# Patient Record
Sex: MALE | Race: WHITE | NOT HISPANIC OR LATINO | Employment: FULL TIME | URBAN - METROPOLITAN AREA
[De-identification: names, ages, dates, MRNs, and addresses within clinical notes are randomized per-mention and may not be internally consistent; named-entity substitution may affect disease eponyms.]

---

## 2021-12-03 ENCOUNTER — APPOINTMENT (OUTPATIENT)
Dept: CT IMAGING | Facility: HOSPITAL | Age: 56
DRG: 312 | End: 2021-12-03
Attending: EMERGENCY MEDICINE
Payer: COMMERCIAL

## 2021-12-03 ENCOUNTER — HOSPITAL ENCOUNTER (INPATIENT)
Facility: HOSPITAL | Age: 56
LOS: 2 days | Discharge: HOME | DRG: 312 | End: 2021-12-05
Attending: EMERGENCY MEDICINE | Admitting: STUDENT IN AN ORGANIZED HEALTH CARE EDUCATION/TRAINING PROGRAM
Payer: COMMERCIAL

## 2021-12-03 DIAGNOSIS — R55 SYNCOPE, UNSPECIFIED SYNCOPE TYPE: ICD-10-CM

## 2021-12-03 DIAGNOSIS — R00.1 SINUS BRADYCARDIA: ICD-10-CM

## 2021-12-03 LAB
ALBUMIN SERPL-MCNC: 4.1 G/DL (ref 3.5–5)
ALP SERPL-CCNC: 122 U/L (ref 45–120)
ALT SERPL W P-5'-P-CCNC: 61 U/L (ref 0–45)
ANION GAP SERPL CALCULATED.3IONS-SCNC: 13 MMOL/L (ref 5–18)
AST SERPL W P-5'-P-CCNC: 41 U/L (ref 0–40)
BASOPHILS # BLD AUTO: 0.1 10E3/UL (ref 0–0.2)
BASOPHILS NFR BLD AUTO: 1 %
BILIRUB SERPL-MCNC: 0.7 MG/DL (ref 0–1)
BUN SERPL-MCNC: 12 MG/DL (ref 8–22)
CALCIUM SERPL-MCNC: 9.4 MG/DL (ref 8.5–10.5)
CHLORIDE BLD-SCNC: 106 MMOL/L (ref 98–107)
CO2 SERPL-SCNC: 20 MMOL/L (ref 22–31)
CREAT BLD-MCNC: 0.9 MG/DL (ref 0.7–1.3)
CREAT SERPL-MCNC: 1.04 MG/DL (ref 0.7–1.3)
EOSINOPHIL # BLD AUTO: 0.1 10E3/UL (ref 0–0.7)
EOSINOPHIL NFR BLD AUTO: 1 %
ERYTHROCYTE [DISTWIDTH] IN BLOOD BY AUTOMATED COUNT: 12 % (ref 10–15)
GFR SERPL CREATININE-BSD FRML MDRD: 80 ML/MIN/1.73M2
GFR SERPL CREATININE-BSD FRML MDRD: >60 ML/MIN/1.73M2
GLUCOSE BLD-MCNC: 126 MG/DL (ref 70–125)
HCT VFR BLD AUTO: 43.2 % (ref 40–53)
HGB BLD-MCNC: 14.8 G/DL (ref 13.3–17.7)
HOLD SPECIMEN: NORMAL
IMM GRANULOCYTES # BLD: 0.1 10E3/UL
IMM GRANULOCYTES NFR BLD: 1 %
LACTATE SERPL-SCNC: 1.6 MMOL/L (ref 0.7–2)
LACTATE SERPL-SCNC: 3 MMOL/L (ref 0.7–2)
LYMPHOCYTES # BLD AUTO: 1.3 10E3/UL (ref 0.8–5.3)
LYMPHOCYTES NFR BLD AUTO: 18 %
MAGNESIUM SERPL-MCNC: 1.9 MG/DL (ref 1.8–2.6)
MCH RBC QN AUTO: 28.6 PG (ref 26.5–33)
MCHC RBC AUTO-ENTMCNC: 34.3 G/DL (ref 31.5–36.5)
MCV RBC AUTO: 84 FL (ref 78–100)
MONOCYTES # BLD AUTO: 0.6 10E3/UL (ref 0–1.3)
MONOCYTES NFR BLD AUTO: 9 %
NEUTROPHILS # BLD AUTO: 5.2 10E3/UL (ref 1.6–8.3)
NEUTROPHILS NFR BLD AUTO: 70 %
NRBC # BLD AUTO: 0 10E3/UL
NRBC BLD AUTO-RTO: 0 /100
PLATELET # BLD AUTO: 212 10E3/UL (ref 150–450)
POTASSIUM BLD-SCNC: 3.8 MMOL/L (ref 3.5–5)
PROT SERPL-MCNC: 7.2 G/DL (ref 6–8)
RBC # BLD AUTO: 5.17 10E6/UL (ref 4.4–5.9)
SARS-COV-2 RNA RESP QL NAA+PROBE: NEGATIVE
SODIUM SERPL-SCNC: 139 MMOL/L (ref 136–145)
TROPONIN I SERPL-MCNC: 0.01 NG/ML (ref 0–0.29)
TROPONIN I SERPL-MCNC: 0.02 NG/ML (ref 0–0.29)
TSH SERPL DL<=0.005 MIU/L-ACNC: 2.11 UIU/ML (ref 0.3–5)
WBC # BLD AUTO: 7.2 10E3/UL (ref 4–11)

## 2021-12-03 PROCEDURE — 36415 COLL VENOUS BLD VENIPUNCTURE: CPT | Performed by: EMERGENCY MEDICINE

## 2021-12-03 PROCEDURE — 83735 ASSAY OF MAGNESIUM: CPT | Performed by: EMERGENCY MEDICINE

## 2021-12-03 PROCEDURE — 83605 ASSAY OF LACTIC ACID: CPT

## 2021-12-03 PROCEDURE — C9803 HOPD COVID-19 SPEC COLLECT: HCPCS

## 2021-12-03 PROCEDURE — 85025 COMPLETE CBC W/AUTO DIFF WBC: CPT | Performed by: EMERGENCY MEDICINE

## 2021-12-03 PROCEDURE — 84443 ASSAY THYROID STIM HORMONE: CPT | Performed by: EMERGENCY MEDICINE

## 2021-12-03 PROCEDURE — 250N000011 HC RX IP 250 OP 636

## 2021-12-03 PROCEDURE — 93005 ELECTROCARDIOGRAM TRACING: CPT | Performed by: EMERGENCY MEDICINE

## 2021-12-03 PROCEDURE — 84484 ASSAY OF TROPONIN QUANT: CPT | Performed by: EMERGENCY MEDICINE

## 2021-12-03 PROCEDURE — 250N000013 HC RX MED GY IP 250 OP 250 PS 637

## 2021-12-03 PROCEDURE — 82040 ASSAY OF SERUM ALBUMIN: CPT | Performed by: EMERGENCY MEDICINE

## 2021-12-03 PROCEDURE — 999N000055 HC STATISTIC END TITIAL CO2 MONITORING

## 2021-12-03 PROCEDURE — 87635 SARS-COV-2 COVID-19 AMP PRB: CPT | Performed by: EMERGENCY MEDICINE

## 2021-12-03 PROCEDURE — 84484 ASSAY OF TROPONIN QUANT: CPT

## 2021-12-03 PROCEDURE — 93308 TTE F-UP OR LMTD: CPT

## 2021-12-03 PROCEDURE — 210N000001 HC R&B IMCU HEART CARE

## 2021-12-03 PROCEDURE — 99222 1ST HOSP IP/OBS MODERATE 55: CPT | Performed by: INTERNAL MEDICINE

## 2021-12-03 PROCEDURE — 36415 COLL VENOUS BLD VENIPUNCTURE: CPT

## 2021-12-03 PROCEDURE — 999N000157 HC STATISTIC RCP TIME EA 10 MIN

## 2021-12-03 PROCEDURE — 250N000011 HC RX IP 250 OP 636: Performed by: EMERGENCY MEDICINE

## 2021-12-03 PROCEDURE — 83605 ASSAY OF LACTIC ACID: CPT | Performed by: EMERGENCY MEDICINE

## 2021-12-03 PROCEDURE — 82565 ASSAY OF CREATININE: CPT

## 2021-12-03 PROCEDURE — 71275 CT ANGIOGRAPHY CHEST: CPT

## 2021-12-03 PROCEDURE — 99285 EMERGENCY DEPT VISIT HI MDM: CPT | Mod: 25

## 2021-12-03 PROCEDURE — 86618 LYME DISEASE ANTIBODY: CPT | Performed by: EMERGENCY MEDICINE

## 2021-12-03 RX ORDER — FINASTERIDE 5 MG/1
5 TABLET, FILM COATED ORAL DAILY
Status: DISCONTINUED | OUTPATIENT
Start: 2021-12-04 | End: 2021-12-05 | Stop reason: HOSPADM

## 2021-12-03 RX ORDER — ONDANSETRON 4 MG/1
4 TABLET, ORALLY DISINTEGRATING ORAL EVERY 6 HOURS PRN
Status: DISCONTINUED | OUTPATIENT
Start: 2021-12-03 | End: 2021-12-05 | Stop reason: HOSPADM

## 2021-12-03 RX ORDER — IOPAMIDOL 755 MG/ML
100 INJECTION, SOLUTION INTRAVASCULAR ONCE
Status: COMPLETED | OUTPATIENT
Start: 2021-12-03 | End: 2021-12-03

## 2021-12-03 RX ORDER — ATORVASTATIN CALCIUM 20 MG/1
20 TABLET, FILM COATED ORAL DAILY
COMMUNITY
Start: 2021-04-16

## 2021-12-03 RX ORDER — MONTELUKAST SODIUM 10 MG/1
10 TABLET ORAL EVERY EVENING
COMMUNITY
Start: 2021-07-02 | End: 2022-07-02

## 2021-12-03 RX ORDER — FAMOTIDINE 20 MG/1
20 TABLET, FILM COATED ORAL AT BEDTIME
COMMUNITY

## 2021-12-03 RX ORDER — VIT C/B6/B5/MAGNESIUM/HERB 173 50-5-6-5MG
1 CAPSULE ORAL DAILY
COMMUNITY
Start: 2020-01-01

## 2021-12-03 RX ORDER — ATORVASTATIN CALCIUM 10 MG/1
20 TABLET, FILM COATED ORAL DAILY
Status: DISCONTINUED | OUTPATIENT
Start: 2021-12-04 | End: 2021-12-05 | Stop reason: HOSPADM

## 2021-12-03 RX ORDER — LORATADINE 10 MG/1
10 TABLET ORAL DAILY
Status: DISCONTINUED | OUTPATIENT
Start: 2021-12-04 | End: 2021-12-05 | Stop reason: HOSPADM

## 2021-12-03 RX ORDER — FAMOTIDINE 20 MG/1
20 TABLET, FILM COATED ORAL AT BEDTIME
Status: DISCONTINUED | OUTPATIENT
Start: 2021-12-03 | End: 2021-12-05 | Stop reason: HOSPADM

## 2021-12-03 RX ORDER — ASPIRIN 81 MG/1
81 TABLET ORAL DAILY
COMMUNITY

## 2021-12-03 RX ORDER — LIDOCAINE 40 MG/G
CREAM TOPICAL
Status: DISCONTINUED | OUTPATIENT
Start: 2021-12-03 | End: 2021-12-05 | Stop reason: HOSPADM

## 2021-12-03 RX ORDER — ONDANSETRON 2 MG/ML
4 INJECTION INTRAMUSCULAR; INTRAVENOUS EVERY 6 HOURS PRN
Status: DISCONTINUED | OUTPATIENT
Start: 2021-12-03 | End: 2021-12-05 | Stop reason: HOSPADM

## 2021-12-03 RX ORDER — MONTELUKAST SODIUM 10 MG/1
10 TABLET ORAL EVERY EVENING
Status: DISCONTINUED | OUTPATIENT
Start: 2021-12-03 | End: 2021-12-05 | Stop reason: HOSPADM

## 2021-12-03 RX ORDER — ACETAMINOPHEN 325 MG/1
650 TABLET ORAL EVERY 6 HOURS PRN
Status: DISCONTINUED | OUTPATIENT
Start: 2021-12-03 | End: 2021-12-05 | Stop reason: HOSPADM

## 2021-12-03 RX ORDER — OMEGA-3/DHA/EPA/FISH OIL 300-1000MG
1 CAPSULE ORAL DAILY
COMMUNITY

## 2021-12-03 RX ORDER — FINASTERIDE 5 MG/1
5 TABLET, FILM COATED ORAL DAILY
COMMUNITY
Start: 2021-07-02 | End: 2022-07-02

## 2021-12-03 RX ORDER — ASPIRIN 81 MG/1
81 TABLET ORAL DAILY
Status: DISCONTINUED | OUTPATIENT
Start: 2021-12-04 | End: 2021-12-05 | Stop reason: HOSPADM

## 2021-12-03 RX ORDER — LISINOPRIL 5 MG/1
5 TABLET ORAL DAILY
COMMUNITY
Start: 2021-04-16

## 2021-12-03 RX ORDER — LISINOPRIL 5 MG/1
5 TABLET ORAL DAILY
Status: DISCONTINUED | OUTPATIENT
Start: 2021-12-04 | End: 2021-12-05 | Stop reason: HOSPADM

## 2021-12-03 RX ORDER — ACETAMINOPHEN 650 MG/1
650 SUPPOSITORY RECTAL EVERY 6 HOURS PRN
Status: DISCONTINUED | OUTPATIENT
Start: 2021-12-03 | End: 2021-12-05 | Stop reason: HOSPADM

## 2021-12-03 RX ORDER — LORATADINE 10 MG/1
1 TABLET ORAL DAILY
COMMUNITY

## 2021-12-03 RX ADMIN — OMEPRAZOLE 20 MG: 20 CAPSULE, DELAYED RELEASE ORAL at 21:54

## 2021-12-03 RX ADMIN — ENOXAPARIN SODIUM 40 MG: 40 INJECTION SUBCUTANEOUS at 21:56

## 2021-12-03 RX ADMIN — MONTELUKAST 10 MG: 10 TABLET, FILM COATED ORAL at 21:54

## 2021-12-03 RX ADMIN — IOPAMIDOL 100 ML: 755 INJECTION, SOLUTION INTRAVENOUS at 16:59

## 2021-12-03 RX ADMIN — FAMOTIDINE 20 MG: 20 TABLET, FILM COATED ORAL at 21:55

## 2021-12-03 ASSESSMENT — ACTIVITIES OF DAILY LIVING (ADL)
ADLS_ACUITY_SCORE: 8
ADLS_ACUITY_SCORE: 8
DIFFICULTY_COMMUNICATING: NO
ADLS_ACUITY_SCORE: 4
DIFFICULTY_EATING/SWALLOWING: NO
FALL_HISTORY_WITHIN_LAST_SIX_MONTHS: NO
HEARING_DIFFICULTY_OR_DEAF: NO
WALKING_OR_CLIMBING_STAIRS_DIFFICULTY: NO
CONCENTRATING,_REMEMBERING_OR_MAKING_DECISIONS_DIFFICULTY: NO
TOILETING_ISSUES: NO
DRESSING/BATHING_DIFFICULTY: NO
ADLS_ACUITY_SCORE: 8
WHICH_OF_THE_ABOVE_FUNCTIONAL_RISKS_HAD_A_RECENT_ONSET_OR_CHANGE?: FALL HISTORY
WEAR_GLASSES_OR_BLIND: YES
DOING_ERRANDS_INDEPENDENTLY_DIFFICULTY: NO
VISION_MANAGEMENT: GLASSES
ADLS_ACUITY_SCORE: 4
ADLS_ACUITY_SCORE: 8

## 2021-12-03 ASSESSMENT — MIFFLIN-ST. JEOR
SCORE: 1762.05
SCORE: 1848.24

## 2021-12-03 NOTE — ED NOTES
Bed: JNED-01  Expected date: 12/3/21  Expected time: 4:06 PM  Means of arrival: Ambulance  Comments:  [facility] 56 M multiple syncopal episodes, at rest joyce down to the 40's

## 2021-12-03 NOTE — ED TRIAGE NOTES
Patient presents to ED for evaluation of syncope. Per medics patient at home downstairs began feeling weird and had syncopal episode. Went upstairs to wife to bring to ER and in car another syncopal episode. Medics arrived, hooked up and watched joyce to 40's. Another near syncopal episode to asystole. 10 seconds of compressions done and patient with ROSC. Patient alert upon arrival. 324 of aspirin given. bs 121

## 2021-12-03 NOTE — ED NOTES
"-Patient told RN that he felt \"weird\" and reported it was the feeling he had at home before he lost consciousness. Patient became bradycardic with a heart rate of 35. Event was self-limiting and resolved within 3 seconds. Patient reported feeling back to baseline before RN left room.  "

## 2021-12-03 NOTE — CONSULTS
CARDIOLOGY CONSULT NOTE         Assessment:   1.  Syncope, unspecified syncope type-presume secondary to sinus bradycardia, agree with telemetry.  2.  Sinus bradycardia-no strips saved however according to ER staff symptomatic bradycardia down to heart rates of 20s.  Agree with ruling out metabolic causes as doing.  If profound episodes recur would consider mild dopamine infusion or isoproterenol infusion.  If continues would consider calling in interventional team for possible temporary pacemaker wire.  3.  Hypertension-under good control currently.  4.  Obesity-work on weight loss.  5.  Sleep apnea-continue with CPAP.  6.  Pure hypercholesterolemia-we will need to check fasting lipid profile.  7.  BPH-finasteride.     Plan:   1.  Echo, can do tomorrow.  2.  Monitor on telemetry and document rhythm during bradycardia.  3.  Agree with CAT scan and metabolic work-up as doing.  Would rule out hypothyroidism.  4.  If profound bradycardia tonight not responsive to meds consider temporary pacemaker wire.  5.  We will plan on seeing again while in hospital and can follow with me as primary cardiologist.     Current History:   [facility] has been requested by Dr. Wily Coleman to evaluate aRul Boothe  who is a 56 year old year old white male for bradycardia.  Patient has been in his usual state of health till about 2 days or so ago when he had an an episode of influenza apparently and received a booster shot for COVID-19.  He was on a Zoom conference call today and while in the call had a syncopal spell, he felt shaky and blacked out.  He was out for less than a minute, there was no bladder or bowel incontinence nor was the seizure activity and was able to walk to find his wife.  At home he had another episode, was then able to get to his wife who then started to drive him to the emergency department.  The second episode lasted only a minute or so as well.  In route in the car, he had a third episode,  he did bite his cheek, did not have bowel or bladder incontinence nor was any seizure activity.  She then brought him back home and summoned the paramedics.  Paramedics apparently in route witnessed a bradycardic event with recurrent syncope.  About 10 seconds of chest compressions were given.  While in the emergency department he had an additional episode of bradycardia, there was near syncope with heart rates documented to sinus bradycardia is in the 20s.  Currently he is feeling well.  He does admit to some mildly increased bipedal edema, denies any significant chest discomfort, palpitations, PND, orthopnea, nausea, vomiting or bowel issues.    Past Medical History:   Benign essential hypertension  Pure hypercholesterolemia  GERD  Benign prostatic hypertrophy  Obstructive sleep apnea with CPAP    Past history is negative for cancer, tuberculosis, diabetes mellitus, myocardial infarction,  rheumatic fever, cerebrovascular accident, chronic kidney disease, peptic ulcer disease, chronic obstructive pulmonary disease, or thyroid disorder.    Past Surgical History:   No past surgical history on file. He  has no past surgical history on file.    Family History:   Reviewed, and negative for coronary artery disease but positive for pacemaker in his mother.    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    Meds:   Lisinopril  Finasteride    Allergies:   Chocolate hazelnut flavor    Review of Systems:   A 12 point comprehensive review of systems was  as follows:   General: negative for fatigue, weight loss or weight gain  Constitutional: negative for anorexia, chills, fevers, malaise, night sweats   Eyes: negative for cataracts, color blindness, contacts/glasses, glaucoma, icterus, irritation, redness and visual disturbance  Ears, nose, mouth, throat, and face: negative for ear drainage, earaches, epistaxis, facial  "trauma, hearing loss, hoarseness, nasal congestion, snoring, sore mouth, sore throat, tinnitus and voice change  Respiratory: Positive dyspnea on exertion, negative hemoptysis, sputum production, pleurisy, stridor, wheezing, PND, orthopnea  Cardiovascular: negative for chest pain, chest pressure/discomfort, claudication, dyspnea, exertional chest pressure/discomfort, fatigue, irregular heart beat, lower extremity edema, near-syncope,  palpitations,  syncope   Gastrointestinal: negative for abdominal pain, change in bowel haibits, constipation, diarrhea, dyspepsia, dysphagia, jaundice, melena, nausea, odynophagia, reflux symptoms and vomiting  Genitourinary: negative for decreased stream  Hematologic/lymphatic: negative for bleeding  Musculoskeletal: negative for arthralgias, back pain, bone pain, muscle weakness, myalgias, neck pain and stiff joints  Neurological: Positive for syncope, presyncope, negative coordination problems, dizziness, gait problems, headaches, memory problems, paresthesia, seizures, speech problems, tremors, vertigo and weakness    Objective:     Physical Exam:  BP (!) 146/82   Pulse 83   Temp 98.1  F (36.7  C) (Axillary)   Resp 15   Ht 1.727 m (5' 8\")   Wt 95.3 kg (210 lb)   SpO2 100%   BMI 31.93 kg/m       Head:    Normocephalic, without obvious abnormality, atraumatic   Eyes:    PERRL, conjunctiva/corneas clear, EOM's intact,           Nose:   Nares normal, septum midline, mucosa normal, no drainage  or sinus tenderness   Throat:   Lips, mucosa, and tongue normal; teeth and gums normal   Neck:   Supple, symmetrical, trachea midline, no adenopathy;        thyroid:  No enlargement/tenderness/nodules; no carotid    bruit or JVD   Back:     Symmetric, no curvature, ROM normal, no CVA tenderness   Lungs:     Clear to auscultation bilaterally, respirations unlabored   Chest wall:    No tenderness or deformity   Heart:    Regular rate and rhythm, S1 and S2 normal, no murmur, rub   or gallop "   Abdomen:     Soft, non-tender, bowel sounds active all four quadrants,     no masses, no organomegaly   Extremities:   Extremities normal, atraumatic, no cyanosis or edema   Pulses:   2+ and symmetric all extremities   Skin:   Skin color, texture, turgor normal, no rashes or lesions   Neurologic:   CNII-XII intact. Normal strength, sensation and reflexes       throughout     Cardiographics and Imaging  Radiology Results: Chest x-ray shows chest CT pending    EKG Results: personally reviewed sinus rhythm, borderline leftward axis, normal intervals and axis, no acute changes.      Lab Review   Pertinent Labs  Lab Results: personally reviewed       No results found for: CKTOTAL, CKMB, TROPONINI    Lab Results   Component Value Date    BUN 12 12/03/2021     12/03/2021    CO2 20 (L) 12/03/2021       Lab Results   Component Value Date    WBC 7.2 12/03/2021    HGB 14.8 12/03/2021    HCT 43.2 12/03/2021    MCV 84 12/03/2021     12/03/2021       No results found for: BNP

## 2021-12-03 NOTE — ED PROVIDER NOTES
EMERGENCY DEPARTMENT NOTE     Name: Raul Boothe    Age/Sex: 56 year old male   MRN: [id number]   Evaluation Date & Time:  No admission date for patient encounter.    PCP:    No primary care provider on file.   ED Provider: Wily Coleman D.O.       CHIEF COMPLAINT    Syncope and Fatigue       DIAGNOSIS & DISPOSITION     1. Syncope, unspecified syncope type    2. Sinus bradycardia      DISPOSITION: Admit to cardiac telemetry/PVS    At the conclusion of the encounter I discussed the results of all of the tests and the disposition. The questions were answered. The patient or family acknowledged understanding and was agreeable with the care plan.    TOTAL CRITICAL CARE TIME (EXCLUDING PROCEDURES): Not applicable    PROCEDURES:     PROCEDURE:    Emergency Department Limited Bedside Screening Cardiac Ultrasound   INDICATIONS: Syncope   PROCEDURE PROVIDER: Wily Coleman    WINDOW AND FINDINGS:    SUB-XYPHOID     :      Cardiac activity: Normal  Pericardial effusion: No clinically significant pericardial effusion  Signs of Tamponade physiology: Absent   PARASTERNAL    :  Cardiac activity: Normal  Pericardial effusion: No clinically significant pericardial effusion  Signs of Tamponade physiology: Absent     IMAGES SAVED AND STORED FOR ARCHIVE AND REVIEW: No         EMERGENCY DEPARTMENT COURSE/MEDICAL DECISION MAKING   4:20 PM I met with the patient to gather history and to perform my initial exam.  We discussed treatment options and the plan for care while in the Emergency Department. Proper PPE worn while evaluating the patient in the room including N95 mask, surgical mask, face shield, surgical cap.   4:30 PM Performed bedside Echo.  4:45 PM Paged cardiology.  4:52 PM I discussed case with Dr. Stone, cardiology.   6:30 PM Paged admitting physician.  6:39 PM I [facility] Dr[facility] Benites. Patient accepted for admission.    Raul Boothe is a 56 year old male with relevant past history of IRTA,  "hypertension,GERD, HLD, who presents to the emergency department after having multiple syncopal episodes at home this afternoon. Upon EMS arrival, patient had a near syncopal episode and went asystolic. Compressions started and patient with ROSC after 10 seconds. He has been feeling well recently. Did get his COVID-19 booster on 11/30.    Triage note reviewed: Patient presents to ED for evaluation of syncope. Per medics patient at home downstairs began feeling weird and had syncopal episode. Went upstairs to wife to bring to ER and in car another syncopal episode. Medics arrived, hooked up and watched joyce to 40's. Another near syncopal episode to asystole. 10 seconds of compressions done and patient with ROSC. Patient alert upon arrival. 324 of aspirin given. bs 121    Vital signs:/69 (BP Location: Left arm)   Pulse 82   Temp 100  F (37.8  C) (Oral)   Resp 22   Ht 1.727 m (5' 8\")   Wt 103.9 kg (229 lb)   SpO2 98%   BMI 34.82 kg/m    Pertinent physical exam findings:  Cardiac: Regular rate and rhythm S1-S2 without murmur rub  Pulmonary: Lungs are clear to ascultation bilaterally with good breath sounds  Abdomen: Soft nontender, positive bowel sounds.  No organomegaly or mass  Diagnostic studies:  Imaging:  CTA Chest Abdomen Pelvis w Contrast   Final Result   IMPRESSION:   1.  No evidence of thoracic aortic aneurysm or dissection.            Lab:  Labs Ordered and Resulted from Time of ED Arrival to Time of ED Departure   COMPREHENSIVE METABOLIC PANEL - Abnormal       Result Value    Sodium 139      Potassium 3.8      Chloride 106      Carbon Dioxide (CO2) 20 (*)     Anion Gap 13      Urea Nitrogen 12      Creatinine 1.04      Calcium 9.4      Glucose 126 (*)     Alkaline Phosphatase 122 (*)     AST 41 (*)     ALT 61 (*)     Protein Total 7.2      Albumin 4.1      Bilirubin Total 0.7      GFR Estimate 80     LACTIC ACID WHOLE BLOOD - Abnormal    Lactic Acid 3.0 (*)    TROPONIN I - Normal    Troponin I " 0.02     ISTAT CREATININE POCT - Normal    Creatinine POCT 0.9      GFR, ESTIMATED POCT >60     COVID-19 VIRUS (CORONAVIRUS) BY PCR - Normal    SARS CoV2 PCR Negative     MAGNESIUM - Normal    Magnesium 1.9     CBC WITH PLATELETS AND DIFFERENTIAL    WBC Count 7.2      RBC Count 5.17      Hemoglobin 14.8      Hematocrit 43.2      MCV 84      MCH 28.6      MCHC 34.3      RDW 12.0      Platelet Count 212      % Neutrophils 70      % Lymphocytes 18      % Monocytes 9      % Eosinophils 1      % Basophils 1      % Immature Granulocytes 1      NRBCs per 100 WBC 0      Absolute Neutrophils 5.2      Absolute Lymphocytes 1.3      Absolute Monocytes 0.6      Absolute Eosinophils 0.1      Absolute Basophils 0.1      Absolute Immature Granulocytes 0.1      Absolute NRBCs 0.0     LYME DISEASE CHAIM WITH REFLEX TO WB SERUM   ISTAT CREATININE POCT      EKG Impression: NSR,non ischemic  Interventions:None  Medical decision making: Patient has had 4 episodes of recurrent bradycardia transient symptomatic in that he felt somewhat lightheaded.  Monitor strip showed sinus bradycardia without high degree AV block lowest rate 30 bpm.  Etiology of the intermittent bradycardia is not identified.  Patient is on no medications that should affect rate.  No evidence of pulmonary embolism or thoracic aortic dissection.  No electrolyte abnormality.  Discussed the case with Dr. Stone who saw the patient in the emergency department.  Patient will be admitted to cardiac telemetry ,monitoring rhythm , cardiology consultation in the a.m. If recurrent symptomatic bradycardia that persists and not responsive to medications possible temporary pacemaker.    ED INTERVENTIONS     Medications   aspirin EC tablet 81 mg (has no administration in time range)   atorvastatin (LIPITOR) tablet 20 mg (has no administration in time range)   famotidine (PEPCID) tablet 20 mg (20 mg Oral Given 12/3/21 2155)   finasteride (PROSCAR) tablet 5 mg (has no administration in  time range)   loratadine (CLARITIN) tablet 10 mg (has no administration in time range)   montelukast (SINGULAIR) tablet 10 mg (10 mg Oral Given 12/3/21 2154)   omeprazole (priLOSEC) CR capsule 20 mg (20 mg Oral Given 12/3/21 2154)   lisinopril (ZESTRIL) tablet 5 mg (has no administration in time range)   lidocaine 1 % 0.1-1 mL (has no administration in time range)   lidocaine (LMX4) cream (has no administration in time range)   sodium chloride (PF) 0.9% PF flush 3 mL (3 mLs Intracatheter Given 12/3/21 2138)   sodium chloride (PF) 0.9% PF flush 3 mL (has no administration in time range)   melatonin tablet 1 mg (has no administration in time range)   enoxaparin ANTICOAGULANT (LOVENOX) injection 40 mg (40 mg Subcutaneous Given 12/3/21 2156)   acetaminophen (TYLENOL) tablet 650 mg (has no administration in time range)     Or   acetaminophen (TYLENOL) Suppository 650 mg (has no administration in time range)   ondansetron (ZOFRAN-ODT) ODT tab 4 mg (has no administration in time range)     Or   ondansetron (ZOFRAN) injection 4 mg (has no administration in time range)   iopamidol (ISOVUE-370) solution 100 mL (100 mLs Intravenous Given 12/3/21 1659)       DISCHARGE MEDICATIONS        Review of your medicines      UNREVIEWED medicines. Ask your doctor about these medicines      Dose / Directions   aspirin 81 MG EC tablet      Dose: 81 mg  Take 81 mg by mouth daily  Refills: 0     atorvastatin 20 MG tablet  Commonly known as: LIPITOR      Dose: 20 mg  Take 20 mg by mouth daily  Refills: 0     B COMPLEX 1 PO      Dose: 1 tablet  Take 1 tablet by mouth daily  Refills: 0     Claritin 10 MG tablet  Generic drug: loratadine      Dose: 1 tablet  Take 1 tablet by mouth daily  Refills: 0     famotidine 20 MG tablet  Commonly known as: PEPCID      Dose: 20 mg  Take 20 mg by mouth At Bedtime  Refills: 0     finasteride 5 MG tablet  Commonly known as: PROSCAR      Dose: 5 mg  Take 5 mg by mouth daily  Refills: 0     lisinopril 5 MG  tablet  Commonly known as: ZESTRIL      Dose: 5 mg  Take 5 mg by mouth daily  Refills: 0     montelukast 10 MG tablet  Commonly known as: SINGULAIR      Dose: 10 mg  Take 10 mg by mouth every evening  Refills: 0     MULTIVITAMIN ADULT PO      Dose: 1 tablet  Take 1 tablet by mouth daily  Refills: 0     omeprazole 20 MG DR capsule  Commonly known as: priLOSEC      Dose: 20 mg  Take 20 mg by mouth 2 times daily  Refills: 0        CONTINUE these medicines which have NOT CHANGED      Dose / Directions   coenzyme Q-10 10 MG Caps      Dose: 2 tablet  Take 2 tablets by mouth daily  Refills: 0     fish oil-omega-3 fatty acids 1000 MG capsule      Dose: 1 capsule  Take 1 capsule by mouth daily  Refills: 0     Turmeric 500 MG Caps      Dose: 1 capsule  Take 1 capsule by mouth daily  Refills: 0              INFORMATION SOURCE AND LIMITATIONS    History/Exam limitations: None  Patient information was obtained from: Patient and EMS  Use of : N/A    HISTORY OF PRESENT ILLNESS   Raul Boothe male with a relevant past history of RITA, hypertension,GERD, HLD, who presents to this ED via EMS for evaluation of syncope.    Per EMS, patient coming from home where he had a sudden syncopal episode while talking on the phone at his desk earlier this afternoon. His wife tried to get him into the car to come to the ER when he had another syncopal episode. She called paramedics. He was normal sinus rhythm upon medic arrival but then became bradycardic down to the 40's. He came back up to the 70's before becoming asystolic. Compressions were started and pulse returned 10 seconds later. Given 324 mg Aspirin. IV placed. Patient feels improved now but is generally cold. He has no cardiac history but states father has a pacemaker.     Patient reports feeling unwell earlier today before passing out. He was feeling nauseated. He received his COVID-19 booster 2 mornings ago and felt fatigued yesterday. No vomiting or diarrhea lately. He  "is otherwise healthy.      REVIEW OF SYSTEMS:   Constitutional: Negative for  fever.   HENT: Negative for URI symptoms or sore throat.    Cardiac: Negative for  chest pain,palpitations, near syncope or syncope  Respiratory: Negative for cough and shortness of breath.    Gastrointestinal: Negative for abdominal pain, vomiting, constipation, diarrhea, rectal bleeding or melena. Positive for nausea   Genitourinary: Negative for dysuria, flank pain and hematuria.   Musculoskeletal: Negative for back pain.   Skin: Negative for  rash  Neurological: Negative for dizziness, headache, speech difficulty, unilateral weakness or imbalance with walking. Positive for multiple syncopal episodes  Hematological: Negative for adenopathy. Does not bruise/bleed easily.   Psychiatric/Behavioral: Negative for confusion.       PATIENT HISTORY   No past medical history on file.  Patient Active Problem List   Diagnosis     Syncope, unspecified syncope type     No past surgical history on file.  Social Histrory  Smoking:  Alcohol Use:  Allergies   Allergen Reactions     Chocolate Hazelnut Flavor Itching         OUTPATIENT MEDICATIONS     Current Discharge Medication List         Vitals:    12/03/21 1715 12/03/21 1716 12/03/21 1901 12/03/21 1925   BP: (!) 152/72 (!) 152/72  133/69   BP Location:    Left arm   Pulse: 87 87  82   Resp: 20 20  22   Temp:    100  F (37.8  C)   TempSrc:    Oral   SpO2: 97% 97%  98%   Weight:   103.9 kg (229 lb)    Height:    1.727 m (5' 8\")       Physical Exam   Constitutional: Oriented to person, place, and time. Appears well-developed and well-nourished.   HEENT:    Head: Atraumatic.   Neck: Normal range of motion. Neck supple.   Cardiovascular: Normal rate, regular rhythm and normal heart sounds.    Pulmonary/Chest: Normal effort  and breath sounds normal.   Abdominal: Soft. Bowel sounds are normal.   Musculoskeletal: Normal range of motion.   Neurological: Alert and oriented to person, place, and time. Normal " strength.No sensory deficit. No cranial nerve deficit . Skin: Skin is warm and dry.   Psychiatric: Normal mood and affect. Behavior is normal. Thought content normal.       DIAGNOSTICS    LABORATORY FINDINGS (REVIEWED AND INTERPRETED):  Labs Ordered and Resulted from Time of ED Arrival to Time of ED Departure   COMPREHENSIVE METABOLIC PANEL - Abnormal       Result Value    Sodium 139      Potassium 3.8      Chloride 106      Carbon Dioxide (CO2) 20 (*)     Anion Gap 13      Urea Nitrogen 12      Creatinine 1.04      Calcium 9.4      Glucose 126 (*)     Alkaline Phosphatase 122 (*)     AST 41 (*)     ALT 61 (*)     Protein Total 7.2      Albumin 4.1      Bilirubin Total 0.7      GFR Estimate 80     LACTIC ACID WHOLE BLOOD - Abnormal    Lactic Acid 3.0 (*)    TROPONIN I - Normal    Troponin I 0.02     ISTAT CREATININE POCT - Normal    Creatinine POCT 0.9      GFR, ESTIMATED POCT >60     COVID-19 VIRUS (CORONAVIRUS) BY PCR - Normal    SARS CoV2 PCR Negative     MAGNESIUM - Normal    Magnesium 1.9     CBC WITH PLATELETS AND DIFFERENTIAL    WBC Count 7.2      RBC Count 5.17      Hemoglobin 14.8      Hematocrit 43.2      MCV 84      MCH 28.6      MCHC 34.3      RDW 12.0      Platelet Count 212      % Neutrophils 70      % Lymphocytes 18      % Monocytes 9      % Eosinophils 1      % Basophils 1      % Immature Granulocytes 1      NRBCs per 100 WBC 0      Absolute Neutrophils 5.2      Absolute Lymphocytes 1.3      Absolute Monocytes 0.6      Absolute Eosinophils 0.1      Absolute Basophils 0.1      Absolute Immature Granulocytes 0.1      Absolute NRBCs 0.0     LYME DISEASE CHAIM WITH REFLEX TO WB SERUM   ISTAT CREATININE POCT         IMAGING (REVIEWED AND INTERPRETED):  CTA Chest Abdomen Pelvis w Contrast   Final Result   IMPRESSION:   1.  No evidence of thoracic aortic aneurysm or dissection.                 ECG (REVIEWED AND INTERPRETED):   ECG:   Performed at: 16:29  HR:  87 bpm  Rhythm: NSR  Axis: 35  QRS duration: 86  ms  QTC: 36/428 ms  ST changes: No ST segment elevation or depression, no T wave inversion,No Q wave  Interpretation: NSR.   Compared to most recent ECG from: No prior for comparison    I have reviewed the patient's ECG, with comments made as listed above. Please see scanned image for full interpretation.       I, Iris Adler, am serving as a scribe to document services personally performed by Wily Coleman D.O., based on my observation and the provider s statements to me.    I, Wily Coleman D.O., attest that Iris Adler is acting in a scribe capacity, has observed my performance of the services and has documented them in accordance with my direction.    Wily Coleman D.O.  EMERGENCY MEDICINE   12/03/21  [facility] EMERGENCY DEPARTMENT  [address] [place] [id number]  [phone]  Dept: [phone]       Wily Coleman, DO  12/03/21 2329       Wily Coleman, DO  12/03/21 2332       Wily Coleman, DO  12/04/21 [id number]

## 2021-12-04 ENCOUNTER — APPOINTMENT (OUTPATIENT)
Dept: CARDIOLOGY | Facility: HOSPITAL | Age: 56
DRG: 312 | End: 2021-12-04
Payer: COMMERCIAL

## 2021-12-04 LAB
ANION GAP SERPL CALCULATED.3IONS-SCNC: 11 MMOL/L (ref 5–18)
BUN SERPL-MCNC: 12 MG/DL (ref 8–22)
CALCIUM SERPL-MCNC: 9 MG/DL (ref 8.5–10.5)
CHLORIDE BLD-SCNC: 108 MMOL/L (ref 98–107)
CO2 SERPL-SCNC: 21 MMOL/L (ref 22–31)
CREAT SERPL-MCNC: 0.88 MG/DL (ref 0.7–1.3)
ERYTHROCYTE [DISTWIDTH] IN BLOOD BY AUTOMATED COUNT: 12.1 % (ref 10–15)
GFR SERPL CREATININE-BSD FRML MDRD: >90 ML/MIN/1.73M2
GLUCOSE BLD-MCNC: 102 MG/DL (ref 70–125)
HCT VFR BLD AUTO: 41.8 % (ref 40–53)
HGB BLD-MCNC: 13.8 G/DL (ref 13.3–17.7)
LVEF ECHO: NORMAL
MCH RBC QN AUTO: 28.3 PG (ref 26.5–33)
MCHC RBC AUTO-ENTMCNC: 33 G/DL (ref 31.5–36.5)
MCV RBC AUTO: 86 FL (ref 78–100)
PLATELET # BLD AUTO: 203 10E3/UL (ref 150–450)
POTASSIUM BLD-SCNC: 4.1 MMOL/L (ref 3.5–5)
RBC # BLD AUTO: 4.88 10E6/UL (ref 4.4–5.9)
SODIUM SERPL-SCNC: 140 MMOL/L (ref 136–145)
TROPONIN I SERPL-MCNC: 0.01 NG/ML (ref 0–0.29)
TROPONIN I SERPL-MCNC: <0.01 NG/ML (ref 0–0.29)
WBC # BLD AUTO: 7.6 10E3/UL (ref 4–11)

## 2021-12-04 PROCEDURE — 999N000208 ECHOCARDIOGRAM COMPLETE

## 2021-12-04 PROCEDURE — 99232 SBSQ HOSP IP/OBS MODERATE 35: CPT | Performed by: INTERNAL MEDICINE

## 2021-12-04 PROCEDURE — 255N000002 HC RX 255 OP 636: Performed by: STUDENT IN AN ORGANIZED HEALTH CARE EDUCATION/TRAINING PROGRAM

## 2021-12-04 PROCEDURE — 250N000013 HC RX MED GY IP 250 OP 250 PS 637

## 2021-12-04 PROCEDURE — 99222 1ST HOSP IP/OBS MODERATE 55: CPT | Mod: AI

## 2021-12-04 PROCEDURE — 250N000011 HC RX IP 250 OP 636

## 2021-12-04 PROCEDURE — 85027 COMPLETE CBC AUTOMATED: CPT

## 2021-12-04 PROCEDURE — 36415 COLL VENOUS BLD VENIPUNCTURE: CPT

## 2021-12-04 PROCEDURE — 93306 TTE W/DOPPLER COMPLETE: CPT | Mod: 26 | Performed by: INTERNAL MEDICINE

## 2021-12-04 PROCEDURE — 210N000001 HC R&B IMCU HEART CARE

## 2021-12-04 PROCEDURE — 84484 ASSAY OF TROPONIN QUANT: CPT

## 2021-12-04 PROCEDURE — 80048 BASIC METABOLIC PNL TOTAL CA: CPT

## 2021-12-04 PROCEDURE — C8929 TTE W OR WO FOL WCON,DOPPLER: HCPCS

## 2021-12-04 RX ADMIN — OMEPRAZOLE 20 MG: 20 CAPSULE, DELAYED RELEASE ORAL at 21:07

## 2021-12-04 RX ADMIN — LISINOPRIL 5 MG: 5 TABLET ORAL at 08:22

## 2021-12-04 RX ADMIN — OMEPRAZOLE 20 MG: 20 CAPSULE, DELAYED RELEASE ORAL at 08:22

## 2021-12-04 RX ADMIN — ACETAMINOPHEN 650 MG: 325 TABLET ORAL at 21:14

## 2021-12-04 RX ADMIN — FAMOTIDINE 20 MG: 20 TABLET, FILM COATED ORAL at 21:07

## 2021-12-04 RX ADMIN — ATORVASTATIN CALCIUM 20 MG: 10 TABLET, FILM COATED ORAL at 08:22

## 2021-12-04 RX ADMIN — FINASTERIDE 5 MG: 5 TABLET, FILM COATED ORAL at 08:22

## 2021-12-04 RX ADMIN — ENOXAPARIN SODIUM 40 MG: 40 INJECTION SUBCUTANEOUS at 21:07

## 2021-12-04 RX ADMIN — MONTELUKAST 10 MG: 10 TABLET, FILM COATED ORAL at 21:08

## 2021-12-04 RX ADMIN — PERFLUTREN 4 ML: 6.52 INJECTION, SUSPENSION INTRAVENOUS at 08:59

## 2021-12-04 RX ADMIN — ASPIRIN 81 MG: 81 TABLET, COATED ORAL at 08:22

## 2021-12-04 RX ADMIN — LORATADINE 10 MG: 10 TABLET ORAL at 08:22

## 2021-12-04 ASSESSMENT — ACTIVITIES OF DAILY LIVING (ADL)
ADLS_ACUITY_SCORE: 4

## 2021-12-04 ASSESSMENT — MIFFLIN-ST. JEOR: SCORE: 1823.29

## 2021-12-04 NOTE — PLAN OF CARE
VSS. Pt denies dizziness or lightheadedness when sitting at bedside to void or when up for standing weight. Pt to ave ECHO today. Education provided. Rates musculoskeletal sternal pain secondary to CPR as a +3; Declined analgesics.

## 2021-12-04 NOTE — ED NOTES
"[facility] ED Handoff Report    ED Chief Complaint: Syncope    ED Diagnosis:  (R55) Syncope, unspecified syncope type  Comment: Had multiple events today  Plan: Observe       PMH:  No past medical history on file.     Code Status:  No Order     Falls Risk: No Band: Not applicable    Current Living Situation/Residence: lives with a significant other     Elimination Status: Continent: Yes     Activity Level: Independent    Patients Preferred Language:  English     Needed: No    Vital Signs:  BP (!) 152/72   Pulse 87   Temp 98.1  F (36.7  C) (Axillary)   Resp 20   Ht 1.727 m (5' 8\")   Wt 95.3 kg (210 lb)   SpO2 97%   BMI 31.93 kg/m       Cardiac Rhythm: Normal sinus    Pain Score: 1/10    Is the Patient Confused:  No    Last Food or Drink: 12/03/21 at Unknown    Focused Assessment:  He had multiple episodes of syncope at home today. Attempted to have wife bring him in but had another syncopal episode so they called for EMS. With EMS he joyce buffy to the 40s then asystole. EMS performed a few chest compressions and his circulation returned. He became alert again with EMS. He has had one episode of joyce here in the ED. He did not go to asystole in the ED. He is here with his wife.    Tests Performed: Done: Labs    Treatments Provided:  Labs images    Family Dynamics/Concerns: No    Family Updated On Visitor Policy: Yes    Plan of Care Communicated to Family: Yes    Who Was Updated about Plan of Care: Wife    Belongings Checklist Done and Signed by Patient: Yes    Covid: asymptomatic , Unknown    Additional Information: None    RN: Sanjay PERKINS RN 12/3/2021 6:29 PM         " after hours

## 2021-12-04 NOTE — PROVIDER NOTIFICATION
Hospitalist paged about lactic acid and diet order.    Addendum:  Hospitalist responded - mistake in Epic, this is house patient.  House paged.

## 2021-12-04 NOTE — PHARMACY-ADMISSION MEDICATION HISTORY
Pharmacy Note - Admission Medication History    _____________________________________________________________________    Prior To Admission (PTA) med list completed and updated in EMR.       PTA Med List   Medication Sig Last Dose     aspirin 81 MG EC tablet Take 81 mg by mouth daily 12/3/2021     atorvastatin (LIPITOR) 20 MG tablet Take 20 mg by mouth daily  12/3/2021     B Complex Vitamins (B COMPLEX 1 PO) Take 1 tablet by mouth daily 12/3/2021     coenzyme Q-10 10 MG CAPS Take 2 tablets by mouth daily  12/3/2021     famotidine (PEPCID) 20 MG tablet Take 20 mg by mouth At Bedtime  12/2/2021     finasteride (PROSCAR) 5 MG tablet Take 5 mg by mouth daily  12/3/2021     fish oil-omega-3 fatty acids 1000 MG capsule Take 1 capsule by mouth daily  12/3/2021     lisinopril (ZESTRIL) 5 MG tablet Take 5 mg by mouth daily  12/3/2021     loratadine (CLARITIN) 10 MG tablet Take 1 tablet by mouth daily 12/3/2021     montelukast (SINGULAIR) 10 MG tablet Take 10 mg by mouth every evening 12/2/2021     Multiple Vitamin (MULTIVITAMIN ADULT PO) Take 1 tablet by mouth daily 12/3/2021     omeprazole (PRILOSEC) 20 MG DR capsule Take 20 mg by mouth 2 times daily 12/3/2021 at am     Turmeric 500 MG CAPS Take 1 capsule by mouth daily 12/3/2021       Information source(s): Patient, Family member and [facility]  Method of interview communication: in-person    Summary of Changes to PTA Med List  Added: all added  Discontinued: -  Changed: -    Patient was asked about OTC/herbal products specifically.  PTA med list reflects this.    In the past week, patient estimated taking medication this percent of the time:  greater than 90%.    Allergies were reviewed, assessed, and updated with the patient.      Patient does not use any multi-dose medications prior to admission.    The information provided in this note is only as accurate as the sources available at the time of the update(s).    Thank you for the opportunity to  participate in the care of this patient.    Felicita Wolfe, PharmD, BCPS   12/03/21 6:03 PM

## 2021-12-04 NOTE — PROGRESS NOTES
[facility]    Progress Note - [facility]        Date of Admission:  12/3/2021    Assessment & Plan             Raul Boothe is a 56 year old male admitted on 12/3/2021. He has a history of RITA, hypertension, hypercholesterolemia and is admitted for symptomatic bradycardia and syncope.     Symptomatic sinus bradycardia  Syncope  Throughout the day today patient has had about 5 episodes of symptomatic bradycardia, including 10 seconds of asystole where EMS performed chest compressions and obtained ROSC very quickly.  Patient has never had this in the past.  ED work-up was rather unremarkable including negative troponin, normal EKG (no signs of AV block),  and normal CTA chest abdomen pelvis. Remainder of labs unremarkable (including TSH). His father has as pacemaker and cardiac stents.  Cardiology was called in the ED and recommended observation overnight with cardiac telemetry. He has been hemodynamically stable, and no further events noted on telemetry. DDx includes, MI, arrhythmia, AV block, lyme's carditis, exaggerated vagal response. Echo pending today.   -cardiology consult   -Lyme's pending from ED  -Echo pending  -Tele  -continue PTA CPAP      Elevated lactic acid, resolved  Patient presents with lactic acid of 3.0. Normal white count and vitally stable. No acute signs of sepsis at this time. Likely from asystolic event at home. Repeat lactic acid 1.6.      Chronic conditions  Hypertension: continue PTA lisinopril  Hyperlipidemia: continue PTA atorvastatin  GERD: continue PTA Pepcid and Prilosec  BPH: continue PTA finasteride  Seasonal Allergies: continue PTA Claritin and singulair       Diet: Combination Diet Regular Diet Adult    DVT Prophylaxis: Enoxaparin (Lovenox) SQ  Can Catheter: Not present  Fluids: None  Central Lines: None  Code Status: Full Code      Disposition Plan   Expected discharge: 1-2 days recommended to prior living arrangement once cardiac  workup complete.     The patient's care was discussed with the Attending Physician, Dr. Deleon.    Jonathan Beckham MD  [place] [facility]  Securely message with the [employer] Web Console (learn more here)  Text page via [employer] Paging/Directory        Clinically Significant Risk Factors Present on Admission          ______________________________________________________________________    Interval History   No further events noted. Feeling OK this AM. Denies exertional chest pain, shortness of breath, abdominal pain or other symptoms.     Data reviewed today: I reviewed all medications, new labs and imaging results over the last 24 hours. I personally reviewed no images or EKG's today.    Physical Exam   Vital Signs: Temp: 99.3  F (37.4  C) Temp src: Oral BP: 125/65 Pulse: 73   Resp: 20 SpO2: 95 % O2 Device: None (Room air) Oxygen Delivery: 2 LPM  Weight: 223 lbs 8 oz  Constitutional: awake, alert, cooperative, no apparent distress, and appears stated age  Eyes: extra-ocular muscles intact  ENT: normocepalic, without obvious abnormality  Respiratory: No increased work of breathing, good air exchange, clear to auscultation bilaterally, no crackles or wheezing  Cardiovascular: Normal apical impulse, regular rate and rhythm, normal S1 and S2, no S3 or S4, and no murmur noted  GI: No scars, normal bowel sounds, soft, non-distended, non-tender, no masses palpated, no hepatosplenomegally  Musculoskeletal: no lower extremity pitting edema present  Neurologic: No focal neurologic deficits    Data   Recent Labs   Lab 12/04/21  0540 12/03/21  1645 12/03/21  1635   WBC 7.6  --  7.2   HGB 13.8  --  14.8   MCV 86  --  84     --  212     --  139   POTASSIUM 4.1  --  3.8   CHLORIDE 108*  --  106   CO2 21*  --  20*   BUN 12  --  12   CR 0.88 0.9 1.04   ANIONGAP 11  --  13   SANA 9.0  --  9.4     --  126*   ALBUMIN  --   --  4.1   PROTTOTAL  --   --  7.2   BILITOTAL  --   --   0.7   ALKPHOS  --   --  122*   ALT  --   --  61*   AST  --   --  41*

## 2021-12-04 NOTE — PLAN OF CARE
Problem: Syncope  Goal: Absence of Syncopal Symptoms  Outcome: Improving     Problem: Chest Pain  Goal: Resolution of Chest Pain Symptoms  Outcome: Improving     No bradycardia or syncopal episodes this shift. Pt denies any dizziness or lightheadedness with standing or walking.  Vitals stable. Rhythm is NSR. Pt still has sternal pain from yesterday's compressions, but declines any pain meds.      Ebony Stout RN

## 2021-12-04 NOTE — H&P
[facility]    History and Physical - [place] [facility]        Date of Admission:  12/3/2021    Assessment & Plan      Raul Boothe is a 56 year old male admitted on 12/3/2021. He has a history of RITA, hypertension, hypercholesterolemia and is admitted for symptomatic bradycardia and syncope.    Symptomatic sinus bradycardia  Syncope  Throughout the day today patient has had about 5 episodes of symptomatic bradycardia, including 10 seconds of asystole where EMS performed chest compressions and obtained ROSC very quickly.  Patient has never had this in the past.  ED work-up was rather unremarkable including negative troponin, normal EKG (no signs of AV block),  and normal CTA chest abdomen pelvis. Remainder of labs unremarkable (including TSH). His father has as pacemaker and cardiac stents.  Cardiology was called in the ED and recommended observation overnight with cardiac telemetry to see how many times this happens.  No further intervention at this time.  He has been hemodynamically stable. Exam was unremarkable.  Possible etiologies include myocardial infarction versus vs RITA (although patient was awake during these episodes) vs exaggerated vasovagal vs sinus node dysfunction (with father with pacemaker, although it was placed following cardiac stent placements). No signs of infection.   -trend troponin  -cardiology consult   -Lyme's pending from ED  -Echo in am  -Tele  -BMP and CBC in am  -continue PTA CPAP     Elevated lactic acid  Patient presents with lactic acid of 3.0. Normal white count and vitally stable. No acute signs of sepsis at this time. Likely from asystolic event at home. Repeat lactic acid 1.6 tonight.     Chronic conditions  Hypertension: continue PTA lisinopril  Hyperlipidemia: continue PTA atorvastatin  GERD: continue PTA Pepcid and Prilosec  BPH: continue PTA finasteride  Seasonal Allergies: continue PTA Claritin and singulair    Diet: Combination Diet Regular Diet  Adult    DVT Prophylaxis: Enoxaparin (Lovenox) SQ  Can Catheter: Not present  Fluids: PO  Central Lines: None  Code Status: Full Code      Disposition Plan   Expected discharge: 1-2 days recommended to prior living arrangement once workup complete.     The patient's care was discussed with the Bedside Nurse, Patient and Patient's Family and morning attending, Dr. Deleon.    Radha Siegel MD  [place] [facility]  Text page via [employer] Paging/Directory    __________________________________________________________    Chief Complaint   Syncope    History is obtained from the patient    History of Present Illness   Raul Boothe is a 56 year old male who has a history of RITA, hypertension, hypercholesterolemia and is admitted for symptomatic bradycardia and sent syncope.  Patient states that suddenly while on a stressful work meeting he started to become flush and feel lightheaded he went upstairs to talk to his wife and then next thing he remembers he is awake on the floor with his wife over him.  They called the nursing line who recommended patient be seen in the ED.  They got to the car about a block away from the house patient had another episode in the car.  He returned home and called EMS.  When EMS arrived he had another episode he was found to be bradycardic.  He then went into asystole for 10 seconds.  He did get short run of chest compressions for this and ROSC was obtained.  Patient states he has never had this feeling before.  He has not been feeling ill recently he has no new medication changes recently.  He states he did just receive his Covid vaccine on Wednesday and was feeling crappy on Thursday because of the vaccine.  He also says he had not eaten all day was told by EMS that his blood glucose was fine.    His father has a pacemaker and cardiac stents.  These were placed many years ago when patient was in college.  No personal history of heart attack or stroke.   He has had a stress test which patient and wife report were normal.    Patient does report that over the last few months he has had foot swelling with associated pain in the morning.  However when he gets up and walks the swelling and pain goes away.  He has had no changes to this in the last couple days and it feels like it has been his baseline.    Review of Systems    The 10 point Review of Systems is negative other than noted in the HPI or here.     Past Medical History    I have reviewed this patient's medical history and updated it with pertinent information if needed.   GERD, hypertension, hyperlipidemia,     Past Surgical History   I have reviewed this patient's surgical history and updated it with pertinent information if needed.  No past surgical history on file.     Social History   I have personally reviewed the social history with the patient showing no alcohol, drug or tobacco use. He lives at home with wife and daughter.    Family History   Father with pacemaker and cardiac stents.  No history of stroke, or diabetes.       Prior to Admission Medications   Prior to Admission Medications   Prescriptions Last Dose Informant Patient Reported? Taking?   B Complex Vitamins (B COMPLEX 1 PO) 12/3/2021  Yes Yes   Sig: Take 1 tablet by mouth daily   Multiple Vitamin (MULTIVITAMIN ADULT PO) 12/3/2021  Yes Yes   Sig: Take 1 tablet by mouth daily   Turmeric 500 MG CAPS 12/3/2021  Yes Yes   Sig: Take 1 capsule by mouth daily   aspirin 81 MG EC tablet 12/3/2021  Yes Yes   Sig: Take 81 mg by mouth daily   atorvastatin (LIPITOR) 20 MG tablet 12/3/2021  Yes Yes   Sig: Take 20 mg by mouth daily    coenzyme Q-10 10 MG CAPS 12/3/2021  Yes Yes   Sig: Take 2 tablets by mouth daily    famotidine (PEPCID) 20 MG tablet 12/2/2021  Yes Yes   Sig: Take 20 mg by mouth At Bedtime    finasteride (PROSCAR) 5 MG tablet 12/3/2021  Yes Yes   Sig: Take 5 mg by mouth daily    fish oil-omega-3 fatty acids 1000 MG capsule 12/3/2021  Yes  Yes   Sig: Take 1 capsule by mouth daily    lisinopril (ZESTRIL) 5 MG tablet 12/3/2021  Yes Yes   Sig: Take 5 mg by mouth daily    loratadine (CLARITIN) 10 MG tablet 12/3/2021  Yes Yes   Sig: Take 1 tablet by mouth daily   montelukast (SINGULAIR) 10 MG tablet 12/2/2021  Yes Yes   Sig: Take 10 mg by mouth every evening   omeprazole (PRILOSEC) 20 MG DR capsule 12/3/2021 at am  Yes Yes   Sig: Take 20 mg by mouth 2 times daily      Facility-Administered Medications: None     Allergies   Allergies   Allergen Reactions     Chocolate Hazelnut Flavor Itching       Physical Exam   Vital Signs: Temp: 100  F (37.8  C) Temp src: Oral BP: 133/69 Pulse: 82   Resp: 22 SpO2: 98 % O2 Device: None (Room air) Oxygen Delivery: 2 LPM  Weight: 210 lbs 0 oz    Constitutional: awake, alert, cooperative, no apparent distress, and appears stated age  Eyes: Lids and lashes normal, pupils equal, round and reactive to light, extra ocular muscles intact, sclera clear, conjunctiva normal  ENT: Normocephalic, without obvious abnormality, atraumatic,  oral pharynx with moist mucous membranes, tonsils without erythema or exudates, gums normal and good dentition.  Hematologic / Lymphatic: no cervical lymphadenopathy and no supraclavicular lymphadenopathy  Respiratory: No increased work of breathing, good air exchange, clear to auscultation bilaterally, no crackles or wheezing  Cardiovascular: Normal apical impulse, regular rate and rhythm, normal S1 and S2, no S3 or S4, and no murmur noted  GI: Normal bowel sounds, soft, non-distended, non-tender, no hepatosplenomegally  Skin: no bruising or bleeding, normal skin color, texture, turgor and no redness, warmth, or swelling  Musculoskeletal: bilateral lower extremity 1+ pitting edema to mid shin  there is no redness, warmth, or swelling of the joints  full range of motion noted  Neurologic: Awake, alert, oriented to name, place and time.  Cranial nerves II-XII are grossly intact.  Motor is 5 out of 5  bilaterally.  Cerebellar finger to nose, heel to shin intact.  Sensory is intact.  Babinski down going, Romberg negative, and gait is normal.  Neuropsychiatric: General: normal, calm and normal eye contact  Affect: normal    Data   Data reviewed today: I reviewed all medications, new labs and imaging results over the last 24 hours. I personally reviewed   EKG showing sinus rhythm with no acute ST elevations.    Recent Labs   Lab 12/03/21  1645 12/03/21  1635   WBC  --  7.2   HGB  --  14.8   MCV  --  84   PLT  --  212   NA  --  139   POTASSIUM  --  3.8   CHLORIDE  --  106   CO2  --  20*   BUN  --  12   CR 0.9 1.04   ANIONGAP  --  13   SANA  --  9.4   GLC  --  126*   ALBUMIN  --  4.1   PROTTOTAL  --  7.2   BILITOTAL  --  0.7   ALKPHOS  --  122*   ALT  --  61*   AST  --  41*   Lactic acid 3.0    Recent Results (from the past 24 hour(s))   CTA Chest Abdomen Pelvis w Contrast    Narrative    EXAM: CTA CHEST ABDOMEN PELVIS W CONTRAST  LOCATION: [facility]  DATE/TIME: 12/3/2021 4:52 PM    INDICATION: Thoracic aortic aneurysm (TAA) suspected  COMPARISON: None.  TECHNIQUE: CT angiogram chest abdomen pelvis during arterial phase of injection of IV contrast. 2D and 3D MIP reconstructions were performed by the CT technologist. Dose reduction techniques were used.   CONTRAST: [id number] 100ml    FINDINGS:   CT ANGIOGRAM CHEST, ABDOMEN, AND PELVIS: Ascending thoracic aorta unremarkable. No evidence of aneurysm or dissection. Bovine configuration of the great vessels. Minor calcified plaque descending thoracic aorta. No evidence of aneurysm or dissection. The   celiac artery, superior mesenteric artery and inferior mesenteric artery are patent. Single renal artery supplies the right kidney. 3 renal arteries supply the left kidney. No evidence of renal artery stenosis. Calcified plaque infrarenal abdominal   aorta. No evidence of abdominal aortic aneurysm. Common iliac, external iliac and internal iliac  arteries are patent bilaterally. Common femoral, proximal profunda femoral and proximal superficial femoral arteries are patent bilaterally.    LUNGS AND PLEURA: No pulmonary nodules. No pleural fluid.    MEDIASTINUM/AXILLAE: Normal.    CORONARY ARTERY CALCIFICATION: Mild.    HEPATOBILIARY: Normal.    PANCREAS: Normal.    SPLEEN: Small splenule at the hilum.    ADRENAL GLANDS: Normal.    KIDNEYS/BLADDER: Left cortical cysts. This is of no clinical significance.    BOWEL: Normal.    LYMPH NODES: Normal.    PELVIC ORGANS: Normal.    MUSCULOSKELETAL: Degenerative change of the thoracic and lumbar spine.      Impression    IMPRESSION:  1.  No evidence of thoracic aortic aneurysm or dissection.         Radha Siegel MD  [facility] Resident. PGY-1  Pager #: [phone]

## 2021-12-04 NOTE — PLAN OF CARE
Brief Cardiac Procedure Note Patient: Ke Bell MRN: [id number]  SSN: [id number] YOB: 1943  Age: 77 y.o. Sex: male Date of Procedure: 3/17/2020 Pre-procedure Diagnosis: Typical Angina Post-procedure Diagnosis: Coronary Artery Disease Procedure: Left Heart Catheterization with Percutaneous Coronary Intervention Brief Description of Procedure: See note Performed By: Uzma Cruz MD  
 
Assistants: None Anesthesia: Moderate Sedation Estimated Blood Loss: Less than 10 mL Specimens: None Implants: None Findings:  
LV:  EDP 40mmHg LM:  NML 
LAD:  Small Septal LAD - 99% proximal, 50% distal 
RI:  80% prox and mid LCx:  30% mid into OM1, 50% prox OM2 
RCA:  50-60% mid PDA PCI pLAD 99-0% 2.25x15 Cindy [place] 2.5x30 [place] 2.5x20 NC Trek PCI RI:  80-0% 2.5x38 [place] 3.0x20 NC Trek 
 
600mg clopidogrel RFA - Mynx Complications: None Recommendations: Continue medical therapy. Signed By: Uzma Cruz MD   
 March 17, 2020 C/o chest pain with movement, likely from chest compressions.  Rates low, declined intervention.  No syncope or bradycardia since arrival to unit.  Encouraged to call when getting up.  Bed alarm on.    Problem: Chest Pain  Goal: Resolution of Chest Pain Symptoms  Outcome: Improving     Problem: Syncope  Goal: Absence of Syncopal Symptoms  Outcome: Improving     Problem: Dysrhythmia  Goal: Normalized Cardiac Rhythm  Outcome: Improving

## 2021-12-04 NOTE — PROGRESS NOTES
"   [facility]  Cardiac Electrophysiology     Progress Note: Wayne Walden MD    Primary Care: Dr. Fabian Ref-Primary, Physician    Assessment:         Syncope: The patient's chart is reviewed as well as the notes from Dr. Stone.  I queried the patient about his episodes as well and as noted by Dr. Stone the patient had both influenza and Covid 19 vaccinations earlier this week.  The patient had little to no fluid and no food in the hours leading up to his initial event.  The patient then had multiple recurrences and unfortunately there are no EMS or ER rhythm strips to document the patient's heart rates and rhythm at the time of his recurrent syncopal event.  There are features of his story which certainly suggest that these may have been vasovagal events including some degree of nausea and \"shivering\" prior to his second event.  We do not have the formal read out from the patient's echocardiogram as yet, but if that is normal and the patient has no further recurrence while on telemetry then he could be scheduled for an outpatient implantable loop recorder insertion in the next 2-3 days.  This would permit ongoing monitoring while correcting his food and fluid consumption habits.  Aggressive daily hydration may be the only therapy necessary for this patient.    Obstructive sleep apnea: The patient is compliant with CPAP therapy.    Active Problems:    Syncope, unspecified syncope type     LOS: 1 day       Recommendations:    Continue cardiac telemetry    Review the patient's echocardiogram    Discuss the role of ILR monitoring as a pertains to work-up for his syncope.      Subjective:  Raul Boothe (56 year old male) is seen in follow-up for multiple episodes of syncope.  I question the patient regarding his initial event and subsequent events.  He has no prior history of syncope or presyncope preceding yesterday's event.  The patient had no warning prior to his syncopal episodes on each occasion " "feeling a warm or tingly sensation prior to the onset of syncope as well as a mild nausea or upset stomach.  The patient had no other cardiac symptoms to report.  He feels that he may have \"bumped his head\" during the first episode of syncope when he stood up from his desk.  Otherwise he was uninjured.  The patient had witnessed syncope both By EMS and in the emergency room but unfortunately no rhythm strips were kept.    No current outpatient medications on file.       Objective:   Vital signs in last 24 hours:  Temp:  [98.1  F (36.7  C)-100  F (37.8  C)] 99.3  F (37.4  C)  Pulse:  [60-87] 73  Resp:  [15-22] 20  BP: (125-154)/(65-82) 125/65  SpO2:  [95 %-100 %] 95 %  Weight:   [unfilled]   Weight change:       Physical Exam:  General: The patient is alert oriented to person place and situation.  The patient is in no acute distress at the time of my evaluation.  Eyes: Pupils are equal, round, and reactive to light.  Conjunctiva and sclera are clear.  ENT: Oral mucosa is moist and without redness. No evident nasal discharge.  Pulmonary: Lungs are clear bilaterally with no rales, rhonchi, or wheezes.    Cardiovascular exam: Rhythm is regular. S1 and S2 are normal. No apparent ana maria. No significant murmur is present. JVP is normal. Lower extremities demonstrate no significant edema. Distal pulses are intact bilaterally.  Abdomen is soft, nontender, with no organomegaly. Bowel sounds are present.  Musculoskeletal: Spine is straight. Extremities without deformity.  Neuro: Gait is normal.   Skin is warm, dry, and otherwise intact.        Cardiographics:   Cardiac telemetry demonstrates normal sinus rhythm with no pauses or bradycardia.  Echocardiogram formal report pending at the time of this dictation.  Radiology:  Results of the CT imaging study were reviewed.    Lab Results:   Lab Results   Component Value Date     12/04/2021    CO2 21 12/04/2021    BUN 12 12/04/2021     Lab Results   Component Value Date    " WBC 7.6 12/04/2021    HGB 13.8 12/04/2021    HCT 41.8 12/04/2021    MCV 86 12/04/2021     12/04/2021     No results found for: INR    Outside record review:

## 2021-12-05 VITALS
RESPIRATION RATE: 16 BRPM | OXYGEN SATURATION: 95 % | DIASTOLIC BLOOD PRESSURE: 59 MMHG | TEMPERATURE: 98.8 F | WEIGHT: 223.5 LBS | HEIGHT: 68 IN | SYSTOLIC BLOOD PRESSURE: 120 MMHG | HEART RATE: 63 BPM | BODY MASS INDEX: 33.87 KG/M2

## 2021-12-05 PROCEDURE — 250N000013 HC RX MED GY IP 250 OP 250 PS 637

## 2021-12-05 PROCEDURE — 99238 HOSP IP/OBS DSCHRG MGMT 30/<: CPT | Mod: GC | Performed by: STUDENT IN AN ORGANIZED HEALTH CARE EDUCATION/TRAINING PROGRAM

## 2021-12-05 PROCEDURE — 99232 SBSQ HOSP IP/OBS MODERATE 35: CPT | Performed by: INTERNAL MEDICINE

## 2021-12-05 RX ADMIN — ATORVASTATIN CALCIUM 20 MG: 10 TABLET, FILM COATED ORAL at 08:38

## 2021-12-05 RX ADMIN — FINASTERIDE 5 MG: 5 TABLET, FILM COATED ORAL at 08:38

## 2021-12-05 RX ADMIN — ASPIRIN 81 MG: 81 TABLET, COATED ORAL at 08:38

## 2021-12-05 RX ADMIN — LORATADINE 10 MG: 10 TABLET ORAL at 08:38

## 2021-12-05 RX ADMIN — LISINOPRIL 5 MG: 5 TABLET ORAL at 08:38

## 2021-12-05 RX ADMIN — OMEPRAZOLE 20 MG: 20 CAPSULE, DELAYED RELEASE ORAL at 08:38

## 2021-12-05 ASSESSMENT — ACTIVITIES OF DAILY LIVING (ADL)
ADLS_ACUITY_SCORE: 4

## 2021-12-05 ASSESSMENT — MIFFLIN-ST. JEOR: SCORE: 1823.29

## 2021-12-05 NOTE — PLAN OF CARE
"  Problem: Syncope  Goal: Absence of Syncopal Symptoms  Outcome: Improving       Patient is compliant and easy going with cares.  Inquisitive and interested in his health. Cardiology following.     Dr. Walden, 12/02, Note:   \"We do not have the formal read out from the patient's echocardiogram as yet, but if that is normal and the patient has no further recurrence while on telemetry then he could be scheduled for an outpatient implantable loop recorder insertion in the next 2-3 days.\"    Telemetry reads Normal Sinus Rhythm.  "

## 2021-12-05 NOTE — PLAN OF CARE
Problem: Adult Inpatient Plan of Care  Goal: Absence of Hospital-Acquired Illness or Injury  Intervention: Identify and Manage Fall Risk     Problem: Chest Pain  Goal: Resolution of Chest Pain Symptoms  Outcome: Improving     Patient complains of slight pain in chest where compressions were performed. Otherwise no pain was reported this shift. No cough noted. Patient educated on falls precautions, using urinal in bed.   Tele: Normal Sinus Brayan.   Patient A/O 4x, makes needs known.

## 2021-12-05 NOTE — PROGRESS NOTES
[facility]  Cardiac Electrophysiology     Progress Note: Wayne Walden MD    Primary Care: Dr. Fabian Ref-Primary, Physician    Assessment:         Syncope: As outlined in my note from 2021-12-04 the patient's clinical presentation suggests a fairly high likelihood that the patient's events are vasovagal in nature.  The patient has had no recurrent symptomatic episodes and no evidence of any sinus node dysfunction or AV conduction abnormalities while being monitored in the hospital.  Echocardiogram demonstrates normal cardiac function and valve status.  Nonetheless given the questions I think it would be reasonable to consider a intermediate approach with implantation of an ILR (implantable loop recorder) as an outpatient early this coming week which would  any significant bradycardia arrhythmias.  I did  the patient and his wife that he needs to dramatically increase his daily fluid intake (target  fluid ounces).    Active Problems:    Syncope, unspecified syncope type     LOS: 2 days       Recommendations:    Okay for discharge today.    Fluid recommendation  fluid ounces of noncaffeinated liquid daily    The patient will be contacted by the heart clinic for scheduling of a implantable loop recorder early this coming week.    I recommended the patient not to operate a motor vehicle for 2 weeks and if he has no recurrent symptoms can resume at that time.    Follow-up with me in the clinic as an outpatient in 6-8 weeks (clinic will schedule with the patient).    Subjective:  Raul Boothe (56 year old male) is seen in follow-up for syncopal episodes.  The patient has had no recurrent symptoms of lightheadedness presyncope or syncope.  The patient also notes that he is eating and drinking on a regular schedule since he has been in the hospital.  I discussed the patient's condition once again with the patient and his wife who is in attendance today.  Rather than pursue a  permanently implanted pacemaker I offered implantation of implantable loop recorder to further define the patient's rhythm status particularly after he initiates an aggressive daily hydration strategy to avoid potential triggering of vasovagal events.    No current outpatient medications on file.       Objective:   Vital signs in last 24 hours:  Temp:  [98.2  F (36.8  C)-99.3  F (37.4  C)] 98.9  F (37.2  C)  Pulse:  [50-75] 50  Resp:  [18-20] 20  BP: (112-130)/(64-75) 112/66  SpO2:  [95 %-98 %] 98 %  Weight:   [unfilled]   Weight change: 6.124 kg (13 lb 8 oz)      Physical Exam:  General: The patient is alert oriented to person place and situation.  The patient is in no acute distress at the time of my evaluation.  Eyes: Pupils are equal, round, and reactive to light.  Conjunctiva and sclera are clear.  ENT: Oral mucosa is moist and without redness. No evident nasal discharge.  Pulmonary: Lungs are clear bilaterally with no rales, rhonchi, or wheezes.    Cardiovascular exam: Rhythm is regular. S1 and S2 are normal. No apparent ana maria. No significant murmur is present. JVP is normal. Lower extremities demonstrate no significant edema. Distal pulses are intact bilaterally.  Abdomen is soft, nontender, with no organomegaly. Bowel sounds are present.  Musculoskeletal: Spine is straight. Extremities without deformity.  Neuro: Gait is normal.   Skin is warm, dry, and otherwise intact.        Cardiographics:   Cardiac telemetry, personally reviewed demonstrates normal sinus rhythm.  No significant bradycardia.  No pauses, no AV block.  Echocardiogram dated 2021-12-03.  Interpretation Summary     The left ventricle is normal in size.  Left ventricular function is normal.The ejection fraction is 55-60%.  No hemodynamically significant valvular abnormalities on 2D or color flow  imaging.  Normal right ventricle size and systolic function.    Radiology:      Lab Results:   Lab Results   Component Value Date      12/04/2021    CO2 21 12/04/2021    BUN 12 12/04/2021     Lab Results   Component Value Date    WBC 7.6 12/04/2021    HGB 13.8 12/04/2021    HCT 41.8 12/04/2021    MCV 86 12/04/2021     12/04/2021     No results found for: INR    Outside record review:

## 2021-12-05 NOTE — PLAN OF CARE
Problem: Adult Inpatient Plan of Care  Goal: Plan of Care Review  Outcome: Adequate for Discharge  Goal: Patient-Specific Goal (Individualized)  Outcome: Adequate for Discharge  Goal: Absence of Hospital-Acquired Illness or Injury  Outcome: Adequate for Discharge  Intervention: Identify and Manage Fall Risk  Recent Flowsheet Documentation  Taken 12/5/2021 0830 by Ebony Stout RN  Safety Promotion/Fall Prevention:   assistive device/personal items within reach   nonskid shoes/slippers when out of bed   patient and family education   room near nurse's station  Intervention: Prevent and Manage VTE (Venous Thromboembolism) Risk  Recent Flowsheet Documentation  Taken 12/5/2021 0830 by Ebony Stout RN  VTE Prevention/Management: ambulation promoted  Goal: Optimal Comfort and Wellbeing  Outcome: Adequate for Discharge  Goal: Readiness for Transition of Care  Outcome: Adequate for Discharge     Problem: Chest Pain  Goal: Resolution of Chest Pain Symptoms  Outcome: Adequate for Discharge     Problem: Syncope  Goal: Absence of Syncopal Symptoms  Outcome: Adequate for Discharge     Problem: Dysrhythmia  Goal: Normalized Cardiac Rhythm  Outcome: Adequate for Discharge  Intervention: Monitor and Manage Cardiac Rhythm Effect  Recent Flowsheet Documentation  Taken 12/5/2021 0830 by Ebony Stout RN  VTE Prevention/Management: ambulation promoted       No further events of arrhythmia or bradycardia on telemetry. Rhythm is sinus jyoce (rate 50's at rest) to NSR. No dizziness or lightheadedness.    Denies pain other than chest discomfort from chest compressions on Friday.     Discharge instructions reviewed with patient and wife, including plan to get loop recorder placed early this week. All questions answered. Pt discharged home at 1415.    Ebony Stout RN

## 2021-12-05 NOTE — DISCHARGE SUMMARY
[facility]  Discharge Summary - Medicine & Pediatrics       Date of Admission:  12/3/2021  Date of Discharge:  12/5/2021  Discharging Provider: Jonathan Beckham MD  Discharge Service: [place]    Discharge Diagnoses   Symptomatic Bradycardia    Follow-ups Needed After Discharge   Follow up with cardiology for loop recorder in next 1-2 days    Unresulted Labs Ordered in the Past 30 Days of this Admission     Date and Time Order Name Status Description    12/3/2021  5:35 PM Lyme Disease Julia with reflex to WB Serum In process       These results will be followed up by Residency service    Discharge Disposition   Discharged to home  Condition at discharge: Stable    Hospital Course   Raul Boothe was admitted on 12/3/2021. He has a history of RITA, hypertension, hypercholesterolemia and is admitted for symptomatic bradycardia and syncope.    Symptomatic sinus bradycardia  Syncope  Throughout the day on admission patient had about 5 episodes of symptomatic bradycardia, including 10 seconds of asystole where EMS performed chest compressions and obtained ROSC very quickly.  Patient has never had this in the past.  ED work-up was rather unremarkable including negative troponin, normal EKG (no signs of AV block),  and normal CTA chest abdomen pelvis. Remainder of labs unremarkable (including TSH). His father has as pacemaker and cardiac stents.  Cardiology was called in the ED and recommended observation overnight with cardiac telemetry. He remained hemodynamically stable, and no further events noted on telemetry. Echo obtained and was unremarkable. Cardiology recommended outpatient loop recorder placement early this week and to follow up in their clinic.   -Lyme's panel pending to evaluate for lyme's carditis  -Per cardiology they will call to set up loop recorder implantation  -Recommended patient push PO intake and not to drive for next 2 weeks.      Elevated lactic acid, resolved  Patient  presented with lactic acid of 3.0. Normal white count and vitally stable. No acute signs of sepsis at this time. Likely from asystolic event at home. Repeat lactic acid 1.6.     Consultations This Hospital Stay   CARDIOLOGY IP CONSULT  CARDIOLOGY IP CONSULT    Code Status   Full Code       The patient was discussed with Dr. Pancho Beckham MD  [place] [facility]  [address] [place] [id number]  Phone: [phone]  Fax: [phone]  ______________________________________________________________________    Physical Exam   Vital Signs: Temp: 98.8  F (37.1  C) Temp src: Oral BP: 120/59 Pulse: 63   Resp: 16 SpO2: 95 % O2 Device: None (Room air)    Weight: 223 lbs 8 oz  Constitutional: awake, alert, cooperative, no apparent distress, and appears stated age  ENT: normocepalic, without obvious abnormality  Respiratory: No increased work of breathing, good air exchange, clear to auscultation bilaterally, no crackles or wheezing  Cardiovascular: Normal apical impulse, regular rate and rhythm, normal S1 and S2, no S3 or S4, and no murmur noted  GI: No scars, normal bowel sounds, soft, non-distended, non-tender, no masses palpated, no hepatosplenomegally  Skin: no bruising or bleeding  Musculoskeletal: no lower extremity pitting edema present  Neurologic: No focal neurologic deficits      Primary Care Physician   Physician No Ref-Primary    Discharge Orders      Adult Cardiology Eval Referral      Reason for your hospital stay    You were hospitalized for a slow heart rate. We did not see any evidence of a heart block or arrhythmia on telemetry monitoring while you were here. Cardiology was consulted and recommended that we place a loop recorder as an outpatient to monitor for future events. They also recommended that you stay well hydrated with at least 100 oz of fluid per day.     Follow-up and recommended labs and tests     Follow up with primary  care provider, Physician No Ref-Primary, within 7 days for hospital follow- up.  No follow up labs or test are needed.     Activity    Your activity upon discharge: activity as tolerated     Diet    Follow this diet upon discharge: Orders Placed This Encounter      Combination Diet Regular Diet Adult       Significant Results and Procedures   Most Recent 3 CBC's:  Recent Labs   Lab Test 12/04/21  0540 12/03/21  1635   WBC 7.6 7.2   HGB 13.8 14.8   MCV 86 84    212     Most Recent 3 BMP's:  Recent Labs   Lab Test 12/04/21  0540 12/03/21  1645 12/03/21  1635     --  139   POTASSIUM 4.1  --  3.8   CHLORIDE 108*  --  106   CO2 21*  --  20*   BUN 12  --  12   CR 0.88 0.9 1.04   ANIONGAP 11  --  13   SANA 9.0  --  9.4     --  126*   ,   Results for orders placed or performed during the hospital encounter of 12/03/21   CTA Chest Abdomen Pelvis w Contrast    Narrative    EXAM: CTA CHEST ABDOMEN PELVIS W CONTRAST  LOCATION: [facility]  DATE/TIME: 12/3/2021 4:52 PM    INDICATION: Thoracic aortic aneurysm (TAA) suspected  COMPARISON: None.  TECHNIQUE: CT angiogram chest abdomen pelvis during arterial phase of injection of IV contrast. 2D and 3D MIP reconstructions were performed by the CT technologist. Dose reduction techniques were used.   CONTRAST: [id number] 100ml    FINDINGS:   CT ANGIOGRAM CHEST, ABDOMEN, AND PELVIS: Ascending thoracic aorta unremarkable. No evidence of aneurysm or dissection. Bovine configuration of the great vessels. Minor calcified plaque descending thoracic aorta. No evidence of aneurysm or dissection. The   celiac artery, superior mesenteric artery and inferior mesenteric artery are patent. Single renal artery supplies the right kidney. 3 renal arteries supply the left kidney. No evidence of renal artery stenosis. Calcified plaque infrarenal abdominal   aorta. No evidence of abdominal aortic aneurysm. Common iliac, external iliac and internal iliac arteries  are patent bilaterally. Common femoral, proximal profunda femoral and proximal superficial femoral arteries are patent bilaterally.    LUNGS AND PLEURA: No pulmonary nodules. No pleural fluid.    MEDIASTINUM/AXILLAE: Normal.    CORONARY ARTERY CALCIFICATION: Mild.    HEPATOBILIARY: Normal.    PANCREAS: Normal.    SPLEEN: Small splenule at the hilum.    ADRENAL GLANDS: Normal.    KIDNEYS/BLADDER: Left cortical cysts. This is of no clinical significance.    BOWEL: Normal.    LYMPH NODES: Normal.    PELVIC ORGANS: Normal.    MUSCULOSKELETAL: Degenerative change of the thoracic and lumbar spine.      Impression    IMPRESSION:  1.  No evidence of thoracic aortic aneurysm or dissection.     Echocardiogram Complete     Value    LVEF  55-60%    [facility]    [id number]  [id number]  [id number]  [id number]^CHRISTINE^[place] [address]     Name: ROMULO VAUGHN  MRN: [id number]  : 1965  Study Date: 2021 08:42 AM  Age: 56 yrs  Gender: Male  Patient Location: [facility]  Reason For Study: Bradycardia - Sinus  Ordering Physician: AMBER KING  Performed By: URSZULA     BSA: 2.1 m2  Height: 68 in  Weight: 223 lb  HR: 63  ______________________________________________________________________________  Procedure  No hemodynamically significant valvular abnormalities on 2D or color flow  imaging.  ______________________________________________________________________________  Interpretation Summary     The left ventricle is normal in size.  Left ventricular function is normal.The ejection fraction is 55-60%.  No hemodynamically significant valvular abnormalities on 2D or color flow  imaging.  Normal right ventricle size and systolic function.  ______________________________________________________________________________  Left Ventricle  The left ventricle is normal in size. Left ventricular function is normal.The  ejection fraction is 55-60%. There is normal left ventricular wall  thickness.  Left ventricular diastolic function is normal. No regional wall motion  abnormalities noted.     Right Ventricle  Normal right ventricle size and systolic function. TAPSE is normal, which is  consistent with normal right ventricular systolic function.     Atria  Normal left atrial size. Right atrial size is normal. There is no color  Doppler evidence of an atrial shunt.     Mitral Valve  Mitral valve leaflets appear normal. There is no evidence of mitral stenosis  or clinically significant mitral regurgitation.     Tricuspid Valve  The tricuspid valve is not well visualized, but is grossly normal. Right  ventricle systolic pressure estimate normal. There is trace tricuspid  regurgitation.     Aortic Valve  Aortic valve leaflets appear normal. There is no evidence of aortic stenosis  or clinically significant aortic regurgitation.     Pulmonic Valve  The pulmonic valve is not well visualized. There is trace pulmonic valvular  regurgitation.     Vessels  The aorta root is normal. Normal size ascending aorta. IVC diameter <2.1 cm  collapsing >50% with sniff suggests a normal RA pressure of 3 mmHg.     Pericardium  There is no pericardial effusion.     ______________________________________________________________________________  MMode/2D Measurements & Calculations  IVSd: 1.3 cm  LVIDd: 4.7 cm  LVIDs: 3.3 cm  LVPWd: 1.0 cm     FS: 30.4 %  LV mass(C)d: 199.5 grams  LV mass(C)dI: 93.2 grams/m2  Ao root diam: 3.3 cm  LA dimension: 3.3 cm  LA/Ao: 1.0  LVOT diam: 2.1 cm  LVOT area: 3.4 cm2  LA Volume Indexed (AL/bp): 26.2 ml/m2  RWT: 0.43     Doppler Measurements & Calculations  MV E max talia: 80.0 cm/sec  MV A max talia: 48.0 cm/sec  MV E/A: 1.7  MV dec slope: 541.2 cm/sec2  MV dec time: 0.15 sec     Ao V2 max: 143.9 cm/sec  Ao max P.0 mmHg  Ao V2 mean: 95.8 cm/sec  Ao mean P.4 mmHg  Ao V2 VTI: 32.0 cm  FABRICE(I,D): 3.0 cm2  FABRICE(V,D): 3.1 cm2  LV V1 max P.7 mmHg  LV V1 max: 129.8 cm/sec  LV V1 VTI: 27.7  cm  SV(LVOT): 94.6 ml  SI(LVOT): 44.2 ml/m2  PA acc time: 0.13 sec  PI end-d silver: 104.5 cm/sec  AV Silver Ratio (DI): 0.90  FABRICE Index (cm2/m2): 1.4  E/E' av.9  Lateral E/e': 5.3  Medial E/e': 8.5     ______________________________________________________________________________  Report approved by: Svitlana Hurtado 2021 01:35 PM               Discharge Medications   Current Discharge Medication List      CONTINUE these medications which have NOT CHANGED    Details   aspirin 81 MG EC tablet Take 81 mg by mouth daily      atorvastatin (LIPITOR) 20 MG tablet Take 20 mg by mouth daily       B Complex Vitamins (B COMPLEX 1 PO) Take 1 tablet by mouth daily      coenzyme Q-10 10 MG CAPS Take 2 tablets by mouth daily       famotidine (PEPCID) 20 MG tablet Take 20 mg by mouth At Bedtime       finasteride (PROSCAR) 5 MG tablet Take 5 mg by mouth daily       fish oil-omega-3 fatty acids 1000 MG capsule Take 1 capsule by mouth daily       lisinopril (ZESTRIL) 5 MG tablet Take 5 mg by mouth daily       loratadine (CLARITIN) 10 MG tablet Take 1 tablet by mouth daily      montelukast (SINGULAIR) 10 MG tablet Take 10 mg by mouth every evening      Multiple Vitamin (MULTIVITAMIN ADULT PO) Take 1 tablet by mouth daily      omeprazole (PRILOSEC) 20 MG DR capsule Take 20 mg by mouth 2 times daily      Turmeric 500 MG CAPS Take 1 capsule by mouth daily           Allergies   Allergies   Allergen Reactions     Nuts Other (See Comments)     Hazelnuts- throat swelling

## 2021-12-06 LAB — B BURGDOR IGG+IGM SER QL: 0.33

## 2021-12-07 ENCOUNTER — LAB (OUTPATIENT)
Dept: LAB | Facility: CLINIC | Age: 56
End: 2021-12-07
Payer: COMMERCIAL

## 2021-12-07 ENCOUNTER — TRANSCRIBE ORDERS (OUTPATIENT)
Dept: CARDIOLOGY | Facility: CLINIC | Age: 56
End: 2021-12-07
Payer: COMMERCIAL

## 2021-12-07 ENCOUNTER — TELEPHONE (OUTPATIENT)
Dept: CARDIOLOGY | Facility: CLINIC | Age: 56
End: 2021-12-07
Payer: COMMERCIAL

## 2021-12-07 ENCOUNTER — DOCUMENTATION ONLY (OUTPATIENT)
Dept: CARDIOLOGY | Facility: CLINIC | Age: 56
End: 2021-12-07
Payer: COMMERCIAL

## 2021-12-07 ENCOUNTER — HOSPITAL ENCOUNTER (OUTPATIENT)
Facility: HOSPITAL | Age: 56
End: 2021-12-07
Attending: INTERNAL MEDICINE | Admitting: INTERNAL MEDICINE
Payer: COMMERCIAL

## 2021-12-07 ENCOUNTER — PREP FOR PROCEDURE (OUTPATIENT)
Dept: CARDIOLOGY | Facility: CLINIC | Age: 56
End: 2021-12-07
Payer: COMMERCIAL

## 2021-12-07 DIAGNOSIS — R55 SYNCOPE, UNSPECIFIED SYNCOPE TYPE: Primary | ICD-10-CM

## 2021-12-07 DIAGNOSIS — Z95.818 STATUS POST PLACEMENT OF IMPLANTABLE LOOP RECORDER: Primary | ICD-10-CM

## 2021-12-07 DIAGNOSIS — R55 SYNCOPE, UNSPECIFIED SYNCOPE TYPE: ICD-10-CM

## 2021-12-07 LAB
ATRIAL RATE - MUSE: 87 BPM
DIASTOLIC BLOOD PRESSURE - MUSE: NORMAL MMHG
INTERPRETATION ECG - MUSE: NORMAL
P AXIS - MUSE: 68 DEGREES
PR INTERVAL - MUSE: 182 MS
QRS DURATION - MUSE: 86 MS
QT - MUSE: 356 MS
QTC - MUSE: 428 MS
R AXIS - MUSE: 35 DEGREES
SARS-COV-2 RNA RESP QL NAA+PROBE: NEGATIVE
SYSTOLIC BLOOD PRESSURE - MUSE: NORMAL MMHG
T AXIS - MUSE: 38 DEGREES
VENTRICULAR RATE- MUSE: 87 BPM

## 2021-12-07 PROCEDURE — U0005 INFEC AGEN DETEC AMPLI PROBE: HCPCS

## 2021-12-07 PROCEDURE — U0003 INFECTIOUS AGENT DETECTION BY NUCLEIC ACID (DNA OR RNA); SEVERE ACUTE RESPIRATORY SYNDROME CORONAVIRUS 2 (SARS-COV-2) (CORONAVIRUS DISEASE [COVID-19]), AMPLIFIED PROBE TECHNIQUE, MAKING USE OF HIGH THROUGHPUT TECHNOLOGIES AS DESCRIBED BY CMS-2020-01-R: HCPCS

## 2021-12-07 RX ORDER — SODIUM CHLORIDE 9 MG/ML
100 INJECTION, SOLUTION INTRAVENOUS CONTINUOUS
Status: CANCELLED | OUTPATIENT
Start: 2021-12-07

## 2021-12-07 RX ORDER — LIDOCAINE 40 MG/G
CREAM TOPICAL
Status: CANCELLED | OUTPATIENT
Start: 2021-12-07

## 2021-12-07 RX ORDER — FENTANYL CITRATE 50 UG/ML
25 INJECTION, SOLUTION INTRAMUSCULAR; INTRAVENOUS
Status: CANCELLED | OUTPATIENT
Start: 2021-12-07

## 2021-12-07 NOTE — PROGRESS NOTES
Addendum to Discharge Summary dated 12/5    # Lactic Acidosis  #Metabolic Acidosis  Lactate 3 on admission likely 2/2 episode of asystole. Resolved with fluids.     Jonathan Beckham MD  [facility] Resident  Pager# [phone]

## 2021-12-07 NOTE — PROGRESS NOTES
H&P  PMD []  Received [] OV: []hosp. admission  Date:12-3 Teach  []   Orders  I [x] P  [x]  Letter []   COVID  FV/EPIC []  Date:  External  []  Date: AC:  None [x]  Continue  []   Hold:  AM Meds: Take all [x]   Hold:       1965  Home:[phone] (home) Cell:[phone] (mobile)  Emergency Contact: GUILLERMINA VAUGHN   PCP: No Ref-Primary, Physician, None      Important patient information for CSC/Cath Lab staff : None    [facility] EP Cath Lab Procedure Order     Device Implant/Revision:  Procedure: New Implant  Device Type: Single Loop  Device Company/Device Rep Needed for Procedure: [employer]  Ordering Provider: Dr Walden  Ordering Date: 12-5-21    Diagnosis:  Syncope  Scheduling Needs Timeframe:  ASAP- this week with any EP per Win  Scheduling Restrictions: None  Scheduling Contact: Please contact pt to schedule, if you are unable to schedule date within the next 24 hours please contact pt to update on scheduling process  Scheduling Follow-up apt for NEW HIS/CRT Implants: NA  Pre-Procedural Testing needed: COVID 19 nasal/lab test within 48hrs of procedure  Anesthesia:  Conscious Sedation- CV RN to administer    [facility] EP Cath Lab Prep   H&P:  Compled by Hospitalist on 12-3-21 if scheduled within 30 days, pt to schedule with PMD if procedure outside of this timeframe    Pre-op Labs: N/A for procedure    Medical Records Pertinent for Procedure:  CT/MRI CTA chest 12-3-21, Echo 12-3-21 EF 55-60% and EKG 12-3-21 SR @87  Most Recent Lab Results: BMP- Within Normal Limits Heme- Within Normal Limits    Patient Education:    Teach with Patient: Will be completed via phone prior to procedure, and letter was also sent to pt via mail/[employer] with written pre-procedural instructions.    Risks Reviewed:   Implantable Loop Recorder Insertion    <1% for each of the following:  infection, bleeding, hematoma,      <1% lead fracture or generator  malfunction.    For patients on anticoagulation, the risk of bleeding, hematoma are  increased.      Pre-Procedure Instruction:  NPO after midnight, Remove all jewelry prior to coming in for procedure, Shower prior to arrival, Notified patient of time and date of procedure by CV , Transportation arrangements needed s/p procedure, Post-procedure follow up process, Sedation plan/orders and Pre-procedure letter was sent to pt by CV     Pre-Procedure Medication Instructions:  Instructions given to pt regarding anticoagulants: Pt is not on an anticoagulant- N/A  Instructions given to pt regarding antiarrhythmic medication: N/A for this type of procedure; N/A  Instructions for medication, other than anticoagulants/antiarrhythmics listed above, given to pt: to take all morning medications with small sips of water, with the exception of OTC supplements and MVI  Allergies: Reviewed allergies, no concerns regarding orders for procedure    Allergies   Allergen Reactions     Nuts Other (See Comments)     Hazelnuts- throat swelling       Current Outpatient Medications:      aspirin 81 MG EC tablet, Take 81 mg by mouth daily, Disp: , Rfl:      atorvastatin (LIPITOR) 20 MG tablet, Take 20 mg by mouth daily , Disp: , Rfl:      B Complex Vitamins (B COMPLEX 1 PO), Take 1 tablet by mouth daily, Disp: , Rfl:      coenzyme Q-10 10 MG CAPS, Take 2 tablets by mouth daily , Disp: , Rfl:      famotidine (PEPCID) 20 MG tablet, Take 20 mg by mouth At Bedtime , Disp: , Rfl:      finasteride (PROSCAR) 5 MG tablet, Take 5 mg by mouth daily , Disp: , Rfl:      fish oil-omega-3 fatty acids 1000 MG capsule, Take 1 capsule by mouth daily , Disp: , Rfl:      lisinopril (ZESTRIL) 5 MG tablet, Take 5 mg by mouth daily , Disp: , Rfl:      loratadine (CLARITIN) 10 MG tablet, Take 1 tablet by mouth daily, Disp: , Rfl:      montelukast (SINGULAIR) 10 MG tablet, Take 10 mg by mouth every evening, Disp: , Rfl:      Multiple Vitamin (MULTIVITAMIN ADULT PO), Take 1 tablet by mouth daily, Disp: , Rfl:      omeprazole  (PRILOSEC) 20 MG DR capsule, Take 20 mg by mouth 2 times daily, Disp: , Rfl:      Turmeric 500 MG CAPS, Take 1 capsule by mouth daily, Disp: , Rfl:     Documentation Date:12/7/2021 9:25 AM  Desi May RN

## 2021-12-07 NOTE — TELEPHONE ENCOUNTER
Phone call from patient with questions regarding coverage for his ILR procedure scheduled on 12-10-21 with Dr. Kunz.      Referral notes reviewed, pt may need to go in his network ([employer]) for coverage.    Return call to patient, explained he should call his insurance company to review further on his voicemail, the patient did not answer, requested he return our call.

## 2021-12-07 NOTE — TELEPHONE ENCOUNTER
----- Message from Laurie Nickerson sent at 12/7/2021  8:43 AM CST -----  Regarding: [facility] PATIENT  General phone call:    Caller: JONNY WIFEGUILLERMINA    Primary cardiologist: [facility]    Detailed reason for call: PATIENTS WIFE IS CALLING RE: SCHEDULING A LOOP RECORDER INSERT. PLEASE CALL AND ADVISE.     Best phone number: [phone]    Best time to contact: ANY    Ok to leave a detailedmessage? YES    Device? NO    Additional Info:

## 2021-12-07 NOTE — TELEPHONE ENCOUNTER
Anh Lisa  P [facility] Ep Support [facility]  Caller: Unspecified (Today,  9:25 AM)  SINGLE LOOP RECORDER IMPLANT WITH DND     630AM ADMIT ON 12/10     H&P ON 12/3 WITH HOSPITALIST     COVID TESTING ON 12/7 AT 3PM IN W     [employer] REPS NOTIFIED ON 12/7     THANKS!   -ANH

## 2021-12-07 NOTE — TELEPHONE ENCOUNTER
Pre-Procedure Education Phone Call    Procedure: Loop Recorder with with Dr Kunz on 12-10-21  Education: Reviewed Pre-Intra-Post education and instructions reviewed via phone  COIVD: scheduled on 12-7-21 at a  facility, results will be viewable in EPIC  Pre-Op: completed and in Epic  Medications: Pt verbalized understanding of medication instructions pre procedure  12/7/2021 12:42 PM  Desi May RN

## 2021-12-07 NOTE — TELEPHONE ENCOUNTER
Noted.  Return call to patient and his wife.    Verbal order from Dr. Walden:  ILR implant this week, with MDT and any EP MD to implant, this week.    Will place orders and pt will be contacted to schedule.

## 2021-12-08 NOTE — TELEPHONE ENCOUNTER
PC back from pt  He states that through his insurance, our providers and facility are considered out of network  He would need to pay a higher cost to go through procedure with us  He is seeking referral from his PMD for an in network cardiology group to receive follow-up care and schedule procedure  Advised pt that I would cnx his procedure, and to seek care through PMD and referring cardiology group  Pt verbalized understanding, has no further questions or concerns at this time, and has our contact information if needed.  12/8/2021 10:08 AM  Elsie Pendleton RN

## 2022-01-16 ENCOUNTER — HEALTH MAINTENANCE LETTER (OUTPATIENT)
Age: 57
End: 2022-01-16

## 2023-01-08 ENCOUNTER — HEALTH MAINTENANCE LETTER (OUTPATIENT)
Age: 58
End: 2023-01-08

## 2023-04-23 ENCOUNTER — HEALTH MAINTENANCE LETTER (OUTPATIENT)
Age: 58
End: 2023-04-23

## 2024-06-29 ENCOUNTER — HEALTH MAINTENANCE LETTER (OUTPATIENT)
Age: 59
End: 2024-06-29